# Patient Record
Sex: MALE | Race: WHITE | Employment: OTHER
[De-identification: names, ages, dates, MRNs, and addresses within clinical notes are randomized per-mention and may not be internally consistent; named-entity substitution may affect disease eponyms.]

---

## 2017-01-04 ENCOUNTER — PRIOR ORIGINAL RECORDS (OUTPATIENT)
Dept: OTHER | Age: 77
End: 2017-01-04

## 2017-05-03 ENCOUNTER — PRIOR ORIGINAL RECORDS (OUTPATIENT)
Dept: OTHER | Age: 77
End: 2017-05-03

## 2017-06-17 ENCOUNTER — HOSPITAL ENCOUNTER (OUTPATIENT)
Facility: HOSPITAL | Age: 77
Setting detail: OBSERVATION
Discharge: HOME | End: 2017-06-19
Attending: EMERGENCY MEDICINE | Admitting: HOSPITALIST
Payer: MEDICARE

## 2017-06-17 DIAGNOSIS — R31.9 HEMATURIA: Primary | ICD-10-CM

## 2017-06-17 LAB
ALBUMIN SERPL-MCNC: 3.4 G/DL (ref 3.5–4.8)
ALP LIVER SERPL-CCNC: 64 U/L (ref 45–117)
ALT SERPL-CCNC: 20 U/L (ref 17–63)
AST SERPL-CCNC: 39 U/L (ref 15–41)
BASOPHILS # BLD AUTO: 0.02 X10(3) UL (ref 0–0.1)
BASOPHILS NFR BLD AUTO: 0.4 %
BILIRUB SERPL-MCNC: 0.8 MG/DL (ref 0.1–2)
BUN BLD-MCNC: 21 MG/DL (ref 8–20)
CALCIUM BLD-MCNC: 9.7 MG/DL (ref 8.3–10.3)
CHLORIDE: 108 MMOL/L (ref 101–111)
CO2: 26 MMOL/L (ref 22–32)
CREAT BLD-MCNC: 0.98 MG/DL (ref 0.7–1.3)
EOSINOPHIL # BLD AUTO: 0.07 X10(3) UL (ref 0–0.3)
EOSINOPHIL NFR BLD AUTO: 1.4 %
ERYTHROCYTE [DISTWIDTH] IN BLOOD BY AUTOMATED COUNT: 13.8 % (ref 11.5–16)
GLUCOSE BLD-MCNC: 91 MG/DL (ref 70–99)
HCT VFR BLD AUTO: 39.7 % (ref 37–53)
HGB BLD-MCNC: 13.4 G/DL (ref 13–17)
IMMATURE GRANULOCYTE COUNT: 0.01 X10(3) UL (ref 0–1)
IMMATURE GRANULOCYTE RATIO %: 0.2 %
INR BLD: 2.95 (ref 0.89–1.11)
LYMPHOCYTES # BLD AUTO: 0.49 X10(3) UL (ref 0.9–4)
LYMPHOCYTES NFR BLD AUTO: 9.6 %
M PROTEIN MFR SERPL ELPH: 6.8 G/DL (ref 6.1–8.3)
MCH RBC QN AUTO: 31.2 PG (ref 27–33.2)
MCHC RBC AUTO-ENTMCNC: 33.8 G/DL (ref 31–37)
MCV RBC AUTO: 92.5 FL (ref 80–99)
MONOCYTES # BLD AUTO: 0.45 X10(3) UL (ref 0.1–0.6)
MONOCYTES NFR BLD AUTO: 8.8 %
NEUTROPHIL ABS PRELIM: 4.05 X10 (3) UL (ref 1.3–6.7)
NEUTROPHILS # BLD AUTO: 4.05 X10(3) UL (ref 1.3–6.7)
NEUTROPHILS NFR BLD AUTO: 79.6 %
PLATELET # BLD AUTO: 119 10(3)UL (ref 150–450)
POTASSIUM SERPL-SCNC: 4.4 MMOL/L (ref 3.6–5.1)
PSA SERPL DL<=0.01 NG/ML-MCNC: 31.4 SECONDS (ref 12–14.3)
RBC # BLD AUTO: 4.29 X10(6)UL (ref 3.8–5.8)
RED CELL DISTRIBUTION WIDTH-SD: 47.3 FL (ref 35.1–46.3)
SODIUM SERPL-SCNC: 138 MMOL/L (ref 136–144)
WBC # BLD AUTO: 5.1 X10(3) UL (ref 4–13)

## 2017-06-17 PROCEDURE — 85610 PROTHROMBIN TIME: CPT | Performed by: EMERGENCY MEDICINE

## 2017-06-17 PROCEDURE — 51702 INSERT TEMP BLADDER CATH: CPT

## 2017-06-17 PROCEDURE — 93005 ELECTROCARDIOGRAM TRACING: CPT

## 2017-06-17 PROCEDURE — 36415 COLL VENOUS BLD VENIPUNCTURE: CPT

## 2017-06-17 PROCEDURE — 99285 EMERGENCY DEPT VISIT HI MDM: CPT

## 2017-06-17 PROCEDURE — 51700 IRRIGATION OF BLADDER: CPT

## 2017-06-17 PROCEDURE — 85025 COMPLETE CBC W/AUTO DIFF WBC: CPT | Performed by: EMERGENCY MEDICINE

## 2017-06-17 PROCEDURE — 93010 ELECTROCARDIOGRAM REPORT: CPT | Performed by: INTERNAL MEDICINE

## 2017-06-17 PROCEDURE — 80053 COMPREHEN METABOLIC PANEL: CPT | Performed by: EMERGENCY MEDICINE

## 2017-06-17 PROCEDURE — 3E1K78Z IRRIGATION OF GENITOURINARY TRACT USING IRRIGATING SUBSTANCE, VIA NATURAL OR ARTIFICIAL OPENING: ICD-10-PCS | Performed by: EMERGENCY MEDICINE

## 2017-06-17 RX ORDER — ACETAMINOPHEN 325 MG/1
325 TABLET ORAL EVERY 6 HOURS PRN
Status: DISCONTINUED | OUTPATIENT
Start: 2017-06-17 | End: 2017-06-19

## 2017-06-17 RX ORDER — MULTIPLE VITAMINS W/ MINERALS TAB 9MG-400MCG
1 TAB ORAL DAILY
Status: DISCONTINUED | OUTPATIENT
Start: 2017-06-17 | End: 2017-06-19

## 2017-06-17 RX ORDER — LIDOCAINE HYDROCHLORIDE 20 MG/ML
10 JELLY TOPICAL ONCE
Status: COMPLETED | OUTPATIENT
Start: 2017-06-17 | End: 2017-06-17

## 2017-06-17 RX ORDER — DOCUSATE SODIUM 100 MG/1
100 CAPSULE, LIQUID FILLED ORAL DAILY PRN
Status: DISCONTINUED | OUTPATIENT
Start: 2017-06-17 | End: 2017-06-19

## 2017-06-17 NOTE — ED INITIAL ASSESSMENT (HPI)
77YM c/c of urinary symptoms Pt state he been having bloody urine since 2200 last night Pt state he passing clots and is on a blood thinner

## 2017-06-17 NOTE — PROGRESS NOTES
1230 PM: As per Dr. Afshan Patino patient to be NPO for a possible procedure. Patient and wife made aware    1812PM: Dr. Holly Bucio made aware of EKG result. NNO .  Output became pinkish in color

## 2017-06-17 NOTE — CONSULTS
[facility]    PATIENT'S NAME: Breezy Esteves   ATTENDING PHYSICIAN: Spring Ireland M.D.   CONSULTING PHYSICIAN: Araceli Valdez M.D.    PATIENT ACCOUNT#:   [de-identified]    LOCATION:  [address] [place] [address]  MEDICAL RECORD #:   [id number]       DATE OF BIRTH: because of mechanical aortic valve replacement, was restarted on Coumadin. Apparently last night, patient developed dark red blood coming from his Lal catheter with abdominal pain and passage of clots.   Since the patient has been in the hospital, he rec normal.  CBC is normal.    EKG pending at this time. ASSESSMENT:  Genitourinary bleeding, probably related to small trauma or small bleeder post prostate surgery in the setting of full-dose anticoagulation. We will hold Coumadin but will not reverse.

## 2017-06-17 NOTE — ED PROVIDER NOTES
Patient Seen in: [facility] Emergency Department    History   Patient presents with:  Urinary Symptoms (urologic)    Stated Complaint:     HPI    64-year-old male complaining of hematuria.   The patient has a history of mechanical aortic valve replac Comment left superior gluteal artery aneurysm       Medications :   docusate sodium 100 MG Oral Cap,  Take 1 capsule (100 mg total) by mouth daily as needed for constipation.    Warfarin Sodium 4 MG Oral Tab,  Take 1 or 1-1/2 tablets by mouth daily as direc suprapubic abdominal tenderness no masses guarding or rebound genitourinary exam is grossly normal neurologic exam no focal deficits.     ED Course     Labs Reviewed   COMP METABOLIC PANEL (14) - Abnormal; Notable for the following:     BUN 21 (*)     Album

## 2017-06-17 NOTE — H&P
[facility] hospitalist H+P    PCP;Jake Caceres MD  CC bloody urine    HPI 77 yo male with hx of A-flutter, mechanical AVR, pacemaker,  here with hematuria.  Patient developed suprapubic discomfort and difficulty urinating, was Passing clots throuhout night status: Former Smoker  1.00 Packs/Day  For 20.00 Years     Quit date: 04/29/1978    Smokeless tobacco: Never Used    Alcohol Use: Yes    Comment: wine 1-2 drinks week    Drug Use: No    Sexual Activity: Not on file   Not on file  Other Topics Concern    Ex consulted  If patient needs  procedure/cystoscopy then would need Amoxicillin 1 hour prior to procedure to reduce risk of prosthetic valve endocarditis    Mechanical Aortic valve replacement; needs to be on anticoagulation  Given his hematuria consulted

## 2017-06-17 NOTE — PROGRESS NOTES
NURSING ADMISSION NOTE      Patient admitted via stretcher  Oriented to room. Safety precautions initiated. Bed in low position. Call light in reach.     Dr. Conrado Roca was paged regarding new consult

## 2017-06-17 NOTE — CONSULTS
[facility] Cardiology Consultation Torsten Delacruz MD    The patient was interviewed, examined, the chart was reviewed and the consult was dictated. This is a 76year old male with a chief complaint of hematuria.     Impression:  1.   bleed–post prostate surger

## 2017-06-18 ENCOUNTER — ANESTHESIA (OUTPATIENT)
Dept: SURGERY | Facility: HOSPITAL | Age: 77
End: 2017-06-18

## 2017-06-18 ENCOUNTER — SURGERY (OUTPATIENT)
Age: 77
End: 2017-06-18

## 2017-06-18 ENCOUNTER — ANESTHESIA EVENT (OUTPATIENT)
Dept: SURGERY | Facility: HOSPITAL | Age: 77
End: 2017-06-18

## 2017-06-18 DIAGNOSIS — R31.0 GROSS HEMATURIA: Primary | ICD-10-CM

## 2017-06-18 LAB
ATRIAL RATE: 63 BPM
BASOPHILS # BLD AUTO: 0.03 X10(3) UL (ref 0–0.1)
BASOPHILS NFR BLD AUTO: 0.7 %
EOSINOPHIL # BLD AUTO: 0.11 X10(3) UL (ref 0–0.3)
EOSINOPHIL NFR BLD AUTO: 2.4 %
ERYTHROCYTE [DISTWIDTH] IN BLOOD BY AUTOMATED COUNT: 14.2 % (ref 11.5–16)
HCT VFR BLD AUTO: 39.9 % (ref 37–53)
HGB BLD-MCNC: 13.1 G/DL (ref 13–17)
IMMATURE GRANULOCYTE COUNT: 0.01 X10(3) UL (ref 0–1)
IMMATURE GRANULOCYTE RATIO %: 0.2 %
INR BLD: 2.4 (ref 0.89–1.11)
LYMPHOCYTES # BLD AUTO: 0.71 X10(3) UL (ref 0.9–4)
LYMPHOCYTES NFR BLD AUTO: 15.4 %
MCH RBC QN AUTO: 30.6 PG (ref 27–33.2)
MCHC RBC AUTO-ENTMCNC: 32.8 G/DL (ref 31–37)
MCV RBC AUTO: 93.2 FL (ref 80–99)
MONOCYTES # BLD AUTO: 0.5 X10(3) UL (ref 0.1–0.6)
MONOCYTES NFR BLD AUTO: 10.9 %
NEUTROPHIL ABS PRELIM: 3.24 X10 (3) UL (ref 1.3–6.7)
NEUTROPHILS # BLD AUTO: 3.24 X10(3) UL (ref 1.3–6.7)
NEUTROPHILS NFR BLD AUTO: 70.4 %
P AXIS: 42 DEGREES
P-R INTERVAL: 210 MS
PLATELET # BLD AUTO: 113 10(3)UL (ref 150–450)
PSA SERPL DL<=0.01 NG/ML-MCNC: 26.6 SECONDS (ref 12–14.3)
Q-T INTERVAL: 410 MS
QRS DURATION: 94 MS
QTC CALCULATION (BEZET): 419 MS
R AXIS: 0 DEGREES
RBC # BLD AUTO: 4.28 X10(6)UL (ref 3.8–5.8)
RED CELL DISTRIBUTION WIDTH-SD: 48.7 FL (ref 35.1–46.3)
T AXIS: 27 DEGREES
VENTRICULAR RATE: 63 BPM
WBC # BLD AUTO: 4.6 X10(3) UL (ref 4–13)

## 2017-06-18 PROCEDURE — 85610 PROTHROMBIN TIME: CPT | Performed by: HOSPITALIST

## 2017-06-18 PROCEDURE — 0T5B8ZZ DESTRUCTION OF BLADDER, VIA NATURAL OR ARTIFICIAL OPENING ENDOSCOPIC: ICD-10-PCS | Performed by: UROLOGY

## 2017-06-18 PROCEDURE — 85025 COMPLETE CBC W/AUTO DIFF WBC: CPT | Performed by: HOSPITALIST

## 2017-06-18 PROCEDURE — 0TCB8ZZ EXTIRPATION OF MATTER FROM BLADDER, VIA NATURAL OR ARTIFICIAL OPENING ENDOSCOPIC: ICD-10-PCS | Performed by: UROLOGY

## 2017-06-18 RX ORDER — MAGNESIUM HYDROXIDE 1200 MG/15ML
3000 LIQUID ORAL CONTINUOUS
Status: DISCONTINUED | OUTPATIENT
Start: 2017-06-18 | End: 2017-06-19

## 2017-06-18 RX ORDER — NALOXONE HYDROCHLORIDE 0.4 MG/ML
80 INJECTION, SOLUTION INTRAMUSCULAR; INTRAVENOUS; SUBCUTANEOUS AS NEEDED
Status: DISCONTINUED | OUTPATIENT
Start: 2017-06-18 | End: 2017-06-18 | Stop reason: HOSPADM

## 2017-06-18 RX ORDER — HYDROMORPHONE HYDROCHLORIDE 1 MG/ML
0.5 INJECTION, SOLUTION INTRAMUSCULAR; INTRAVENOUS; SUBCUTANEOUS EVERY 2 HOUR PRN
Status: DISCONTINUED | OUTPATIENT
Start: 2017-06-18 | End: 2017-06-19

## 2017-06-18 RX ORDER — OXYBUTYNIN CHLORIDE 5 MG/1
5 TABLET ORAL 3 TIMES DAILY
Status: DISCONTINUED | OUTPATIENT
Start: 2017-06-18 | End: 2017-06-19

## 2017-06-18 RX ORDER — ACETAMINOPHEN 325 MG/1
650 TABLET ORAL EVERY 6 HOURS
Status: DISCONTINUED | OUTPATIENT
Start: 2017-06-18 | End: 2017-06-19

## 2017-06-18 RX ORDER — SODIUM CHLORIDE, SODIUM LACTATE, POTASSIUM CHLORIDE, CALCIUM CHLORIDE 600; 310; 30; 20 MG/100ML; MG/100ML; MG/100ML; MG/100ML
INJECTION, SOLUTION INTRAVENOUS CONTINUOUS
Status: DISCONTINUED | OUTPATIENT
Start: 2017-06-18 | End: 2017-06-19

## 2017-06-18 RX ORDER — ZOLPIDEM TARTRATE 5 MG/1
5 TABLET ORAL NIGHTLY PRN
Status: DISCONTINUED | OUTPATIENT
Start: 2017-06-18 | End: 2017-06-19

## 2017-06-18 RX ORDER — HYDROMORPHONE HYDROCHLORIDE 1 MG/ML
1 INJECTION, SOLUTION INTRAMUSCULAR; INTRAVENOUS; SUBCUTANEOUS EVERY 2 HOUR PRN
Status: DISCONTINUED | OUTPATIENT
Start: 2017-06-18 | End: 2017-06-19

## 2017-06-18 RX ORDER — ONDANSETRON 4 MG/1
4 TABLET, FILM COATED ORAL EVERY 6 HOURS PRN
Status: DISCONTINUED | OUTPATIENT
Start: 2017-06-18 | End: 2017-06-19

## 2017-06-18 RX ORDER — ONDANSETRON 2 MG/ML
4 INJECTION INTRAMUSCULAR; INTRAVENOUS AS NEEDED
Status: DISCONTINUED | OUTPATIENT
Start: 2017-06-18 | End: 2017-06-18 | Stop reason: HOSPADM

## 2017-06-18 RX ORDER — HYDROMORPHONE HYDROCHLORIDE 1 MG/ML
0.4 INJECTION, SOLUTION INTRAMUSCULAR; INTRAVENOUS; SUBCUTANEOUS EVERY 5 MIN PRN
Status: DISCONTINUED | OUTPATIENT
Start: 2017-06-18 | End: 2017-06-18 | Stop reason: HOSPADM

## 2017-06-18 RX ORDER — ONDANSETRON 2 MG/ML
4 INJECTION INTRAMUSCULAR; INTRAVENOUS EVERY 6 HOURS PRN
Status: DISCONTINUED | OUTPATIENT
Start: 2017-06-18 | End: 2017-06-19

## 2017-06-18 RX ORDER — PHENAZOPYRIDINE HYDROCHLORIDE 200 MG/1
200 TABLET, FILM COATED ORAL 3 TIMES DAILY PRN
Status: DISCONTINUED | OUTPATIENT
Start: 2017-06-18 | End: 2017-06-19

## 2017-06-18 RX ORDER — DOCUSATE SODIUM 100 MG/1
100 CAPSULE, LIQUID FILLED ORAL 2 TIMES DAILY
Status: DISCONTINUED | OUTPATIENT
Start: 2017-06-18 | End: 2017-06-19

## 2017-06-18 RX ORDER — AMOXICILLIN 500 MG/1
2000 CAPSULE ORAL ONCE AS NEEDED
Status: COMPLETED | OUTPATIENT
Start: 2017-06-18 | End: 2017-06-18

## 2017-06-18 NOTE — ANESTHESIA PREPROCEDURE EVALUATION
PRE-OP EVALUATION    Patient Name: Kevin Waller    Pre-op Diagnosis: Gross hematuria [R31.0]    Procedure(s):  Cystoscopy, Clot evacuation fulguration of bleeding    Surgeon(s) and Role:     Neema Sigala MD - Primary    Pre-op vitals reviewed.   Tem ROS.                              Pulmonary    Negative pulmonary ROS. Neuro/Psych    Negative neuro/psych ROS.                                     Past Surgical History    OTHER      Comment TURP    OTHER  2005    EYE SURGERY  2002 discussed with surgeon and patient. Comment: Discussed risks and benefits of GA including sore throat, allergy, nausea, vomiting, dental trauma, pain management modalities, transfusion if needed, etc. Questions answered and options explained.  Patient ac

## 2017-06-18 NOTE — BRIEF OP NOTE
Pre-Operative Diagnosis: Gross hematuria [R31.0], clot retention     Post-Operative Diagnosis: Gross hematuria [R31.0], clot retention     Procedure Performed:   Procedure(s):  Cystoscopy, Clot evacuation fulguration of bleeding    Surgeon(s) and Role:

## 2017-06-18 NOTE — PROGRESS NOTES
Patient went down to OR for procedure. Dentures at the bedside, eyeglass with patient. Wife made aware. Also RN told Surgery about Preop med Amoxicillin PO as per cardio and Metoprolol. Given with sips of water    0830AM: Wife came in and at the bedside.  J

## 2017-06-18 NOTE — ANESTHESIA POSTPROCEDURE EVALUATION
[address] [place] [address] Patient Status:  Observation   Age/Gender 76year old male MRN [id number]   Location [address] Attending Kevan Hunter MD   [address] Day # 1 PCP Joseline Gil MD       Anesthesia Post-op Note

## 2017-06-18 NOTE — PROGRESS NOTES
[facility] hospitalist daily note    Patient was seen/examined on 6/18/17    S; s/p cystoscopy, clot evaluation fulguration of bleeding.  Patient seen post-procedure, hematuria better during my visit, no abdominal pain, no nausea/emesis, no chest pain or SOB

## 2017-06-18 NOTE — PROGRESS NOTES
[facility] Cardiology Progress Note        Lesly Elias Patient Status:  Observation    3/25/1940 MRN [id number]   [facility] 3SW-A Attending Rajeev Lemus MD   Hosp Day # 1 PCP Veronica Moreno MD     Subjective:  Doing well TROP, CK in the last 72 hours.     Recent Labs   Lab  06/17/17   0624  06/18/17   0630   PTP  31.4*  26.6*   INR  2.95*  2.40*                 Impression:  1.      Genitourinary bleed post prostate surgery on Coumadin with therapeutic INR.    s/p cysto toda

## 2017-06-19 VITALS
DIASTOLIC BLOOD PRESSURE: 88 MMHG | RESPIRATION RATE: 18 BRPM | OXYGEN SATURATION: 97 % | HEART RATE: 60 BPM | WEIGHT: 175 LBS | SYSTOLIC BLOOD PRESSURE: 128 MMHG | TEMPERATURE: 99 F | BODY MASS INDEX: 23 KG/M2

## 2017-06-19 LAB
INR BLD: 1.9 (ref 0.89–1.11)
PSA SERPL DL<=0.01 NG/ML-MCNC: 22.1 SECONDS (ref 12–14.3)

## 2017-06-19 PROCEDURE — 85610 PROTHROMBIN TIME: CPT | Performed by: INTERNAL MEDICINE

## 2017-06-19 PROCEDURE — 36415 COLL VENOUS BLD VENIPUNCTURE: CPT | Performed by: INTERNAL MEDICINE

## 2017-06-19 RX ORDER — FINASTERIDE 5 MG/1
5 TABLET, FILM COATED ORAL DAILY
Qty: 90 TABLET | Refills: 3 | Status: SHIPPED | OUTPATIENT
Start: 2017-06-19 | End: 2017-10-26

## 2017-06-19 RX ORDER — FINASTERIDE 5 MG/1
5 TABLET, FILM COATED ORAL DAILY
Status: DISCONTINUED | OUTPATIENT
Start: 2017-06-19 | End: 2017-06-19

## 2017-06-19 RX ORDER — FINASTERIDE 5 MG/1
5 TABLET, FILM COATED ORAL DAILY
Qty: 90 TABLET | Refills: 3 | Status: SHIPPED | OUTPATIENT
Start: 2017-06-19 | End: 2017-06-19

## 2017-06-19 NOTE — PROGRESS NOTES
[facility]  Urology Progress Note    Jorge Frias Patient Status:  Observation    3/25/1940 MRN [id number]   [facility] 3SW-A Attending Aleksander Shields MD   Hosp Day # 2 PCP Raymundo Krueger MD     Subjective:  Jorge Frias is a(n)

## 2017-06-19 NOTE — PLAN OF CARE
GENITOURINARY - ADULT    • Absence of urinary retention Progressing        PAIN - ADULT    • Verbalizes/displays adequate comfort level or patient's stated pain goal Progressing        SAFETY ADULT - FALL    • Free from fall injury Progressing        Carol Beckett

## 2017-06-19 NOTE — PROGRESS NOTES
[facility] Cardiology Progress Note - Molina Rayo Patient Status:  Observation    3/25/1940 MRN [id number]   [facility] 3SW-A Attending René Leaver,*   Hosp Day # 2 PCP Camilo Chery MD     Subjective 1675 ml   Net    960 ml       Last 3 Weights  06/17/17 0543 : 175 lb (79.379 kg)  02/13/17 1130 : 176 lb 6.4 oz (80.015 kg)  10/13/16 1013 : 174 lb (78.926 kg)      Tele: NSR    Physical Exam:    General: Alert and oriented x 3. No apparent distress.  No re MG/ML injection 1 mg 1 mg Intravenous Q2H PRN   Zolpidem Tartrate (AMBIEN) tab 5 mg 5 mg Oral Nightly PRN   docusate sodium (COLACE) cap 100 mg 100 mg Oral BID   ondansetron HCl (ZOFRAN) injection 4 mg 4 mg Intravenous Q6H PRN   Or      Ondansetron HCl (ZO

## 2017-06-19 NOTE — PROGRESS NOTES
[facility] Hospitalist Progress Note                                                                   [address] [facility]  3/25/1940    CC: FU hematuria    Interval History:  - Doing well- hancock remov documentation yesterday, except as otherwise noted in the Interval History above.     Assessment/Plan:  Hematuria; CBI started during admission, Urology consulted  -s/p Cystoscopy, Clot evacuation fulguration of bleeding on 6/18/17  -found to have large BPH

## 2017-06-21 NOTE — OPERATIVE REPORT
[facility]    PATIENT'S NAME: [facility] PHYSICIAN: Mary Conde MD   OPERATING PHYSICIAN: Rajendra Tadeo M.D.    PATIENT ACCOUNT#:   [de-identified]    LOCATION:  3SW-A [facility]  MEDICAL RECORD #:   [id number]       DATE OF FRANCINE mass, or lesion. There was evidence of a small stone present in this region of the prostate too, which was then manipulated free.   With good hemostasis seen, the cystoscope was then withdrawn and a 22-Swedish 3-way Lal catheter was placed and the patient

## 2017-06-22 NOTE — DISCHARGE SUMMARY
General Medicine Discharge Summary     Patient ID:  Anthony Chaudhry  76year old  3/25/1940    Admit date: 6/17/2017    Discharge date and time: 6/19/17    Attending Physician: No att. providers found may be related to the mechanical valve, plt count without significant change from 6/17/17    Aflutter, SSS, PPM; cont metoprolol    Preventative: coumadin      Consults: IP CONSULT TO UROLOGY    Operative Procedures: Procedure(s) (LRB):  CYSTOSCOPY (N/A) MD Syeda Penaloza MD  [facility] Hospitalist  Pager: [phone]

## 2017-11-01 ENCOUNTER — PRIOR ORIGINAL RECORDS (OUTPATIENT)
Dept: OTHER | Age: 77
End: 2017-11-01

## 2017-12-31 ENCOUNTER — PRIOR ORIGINAL RECORDS (OUTPATIENT)
Dept: OTHER | Age: 77
End: 2017-12-31

## 2018-05-02 ENCOUNTER — PRIOR ORIGINAL RECORDS (OUTPATIENT)
Dept: OTHER | Age: 78
End: 2018-05-02

## 2018-11-07 ENCOUNTER — PRIOR ORIGINAL RECORDS (OUTPATIENT)
Dept: OTHER | Age: 78
End: 2018-11-07

## 2019-02-16 ENCOUNTER — APPOINTMENT (OUTPATIENT)
Dept: LAB | Facility: HOSPITAL | Age: 79
End: 2019-02-16
Attending: INTERNAL MEDICINE
Payer: MEDICARE

## 2019-02-16 DIAGNOSIS — I49.5 SICK SINUS SYNDROME (HCC): ICD-10-CM

## 2019-02-16 DIAGNOSIS — I48.92 ATRIAL FLUTTER, PAROXYSMAL (HCC): ICD-10-CM

## 2019-02-16 DIAGNOSIS — Z79.01 LONG TERM CURRENT USE OF ANTICOAGULANT: ICD-10-CM

## 2019-02-16 DIAGNOSIS — I48.3 TYPICAL ATRIAL FLUTTER (HCC): ICD-10-CM

## 2019-02-16 DIAGNOSIS — I34.0 NON-RHEUMATIC MITRAL REGURGITATION: ICD-10-CM

## 2019-02-16 DIAGNOSIS — Z95.2 S/P AORTIC VALVE REPLACEMENT: ICD-10-CM

## 2019-02-16 LAB
ALBUMIN SERPL-MCNC: 3.8 G/DL (ref 3.4–5)
ALP LIVER SERPL-CCNC: 66 U/L (ref 45–117)
ALT SERPL-CCNC: 27 U/L (ref 16–61)
AST SERPL-CCNC: 52 U/L (ref 15–37)
BILIRUB DIRECT SERPL-MCNC: 0.2 MG/DL (ref 0–0.2)
BILIRUB SERPL-MCNC: 1.1 MG/DL (ref 0.1–2)
CHOLEST SMN-MCNC: 135 MG/DL (ref ?–200)
HDLC SERPL-MCNC: 51 MG/DL (ref 40–59)
INR BLD: 2.44 (ref 0.9–1.1)
LDLC SERPL CALC-MCNC: 68 MG/DL (ref ?–100)
M PROTEIN MFR SERPL ELPH: 7.3 G/DL (ref 6.4–8.2)
NONHDLC SERPL-MCNC: 84 MG/DL (ref ?–130)
PSA SERPL DL<=0.01 NG/ML-MCNC: 27.3 SECONDS (ref 12.4–14.7)
TRIGL SERPL-MCNC: 79 MG/DL (ref 30–149)
VLDLC SERPL CALC-MCNC: 16 MG/DL (ref 0–30)

## 2019-02-16 PROCEDURE — 85610 PROTHROMBIN TIME: CPT

## 2019-02-16 PROCEDURE — 36415 COLL VENOUS BLD VENIPUNCTURE: CPT

## 2019-02-16 PROCEDURE — 80076 HEPATIC FUNCTION PANEL: CPT

## 2019-02-16 PROCEDURE — 80061 LIPID PANEL: CPT

## 2019-03-11 ENCOUNTER — ORDER TRANSCRIPTION (OUTPATIENT)
Dept: ADMINISTRATIVE | Facility: HOSPITAL | Age: 79
End: 2019-03-11

## 2019-03-11 DIAGNOSIS — I77.89 ASCENDING AORTA ENLARGEMENT (HCC): Primary | ICD-10-CM

## 2019-03-11 DIAGNOSIS — I77.810 AORTIC ROOT DILATION (HCC): ICD-10-CM

## 2019-03-22 ENCOUNTER — HOSPITAL ENCOUNTER (OUTPATIENT)
Dept: CT IMAGING | Facility: HOSPITAL | Age: 79
Discharge: HOME | End: 2019-03-22
Attending: INTERNAL MEDICINE
Payer: MEDICARE

## 2019-03-22 DIAGNOSIS — I77.89 ASCENDING AORTA ENLARGEMENT (HCC): ICD-10-CM

## 2019-03-22 DIAGNOSIS — I77.810 AORTIC ROOT DILATION (HCC): ICD-10-CM

## 2019-03-22 DIAGNOSIS — I71.2 THORACIC AORTIC ANEURYSM WITHOUT RUPTURE (HCC): ICD-10-CM

## 2019-03-22 LAB — CREAT SERPL-MCNC: 1 MG/DL (ref 0.7–1.3)

## 2019-03-22 PROCEDURE — 71275 CT ANGIOGRAPHY CHEST: CPT | Performed by: INTERNAL MEDICINE

## 2019-03-22 PROCEDURE — 82565 ASSAY OF CREATININE: CPT

## 2019-04-05 ENCOUNTER — HOSPITAL ENCOUNTER (OUTPATIENT)
Age: 79
Discharge: HOME | End: 2019-04-05
Attending: FAMILY MEDICINE
Payer: MEDICARE

## 2019-04-05 VITALS
BODY MASS INDEX: 23.19 KG/M2 | SYSTOLIC BLOOD PRESSURE: 144 MMHG | HEART RATE: 55 BPM | WEIGHT: 175 LBS | DIASTOLIC BLOOD PRESSURE: 99 MMHG | OXYGEN SATURATION: 98 % | HEIGHT: 73 IN | RESPIRATION RATE: 18 BRPM | TEMPERATURE: 98 F

## 2019-04-05 DIAGNOSIS — N41.0 ACUTE PROSTATITIS: Primary | ICD-10-CM

## 2019-04-05 PROCEDURE — 87088 URINE BACTERIA CULTURE: CPT | Performed by: FAMILY MEDICINE

## 2019-04-05 PROCEDURE — 87186 SC STD MICRODIL/AGAR DIL: CPT | Performed by: FAMILY MEDICINE

## 2019-04-05 PROCEDURE — 99214 OFFICE O/P EST MOD 30 MIN: CPT

## 2019-04-05 PROCEDURE — 87086 URINE CULTURE/COLONY COUNT: CPT | Performed by: FAMILY MEDICINE

## 2019-04-05 PROCEDURE — 81002 URINALYSIS NONAUTO W/O SCOPE: CPT | Performed by: FAMILY MEDICINE

## 2019-04-05 PROCEDURE — 99204 OFFICE O/P NEW MOD 45 MIN: CPT

## 2019-04-05 RX ORDER — AMOXICILLIN 875 MG/1
875 TABLET, COATED ORAL 2 TIMES DAILY
Qty: 28 TABLET | Refills: 0 | Status: SHIPPED | OUTPATIENT
Start: 2019-04-05 | End: 2019-04-19

## 2019-04-05 NOTE — ED INITIAL ASSESSMENT (HPI)
Pt c/o \" I think I have prostatitis\". Pt reports mild pain, heaviness in peritoneum that started 1 week ago and has now resolved. Pt states he has noticed a foul smelling urine over this past week. Denies fever, chills, hematuria, or other complaints.

## 2019-04-05 NOTE — ED PROVIDER NOTES
Patient Seen in: [address] [facility]    History   Patient presents with:  Urinary Symptoms (urologic)    Stated Complaint: urinary complaint x2 weeks    HPI    33-year-old male with history of prostatitis and extensive cardiac histo replacement   • OTHER SURGICAL HISTORY  2002    Mitral valve repair   • OTHER SURGICAL HISTORY      inguinal hernia repair   • OTHER SURGICAL HISTORY      spermatocele   • PACEMAKER  2002    [facility]   • REPLACEMENT OF MITRAL VALVE         Family histo Moderate (*)     Nitrite Urine Positive (*)     Leukocyte esterase urine Small (*)     All other components within normal limits   URINE CULTURE, ROUTINE                MDM   78-year-old male with history of prostatitis had symptoms earlier in the week.   H

## 2019-05-08 ENCOUNTER — PRIOR ORIGINAL RECORDS (OUTPATIENT)
Dept: OTHER | Age: 79
End: 2019-05-08

## 2019-05-31 PROBLEM — Z98.890 S/P TRICUSPID VALVE REPAIR: Status: ACTIVE | Noted: 2019-05-31

## 2019-05-31 PROBLEM — Z95.2 S/P MVR (MITRAL VALVE REPLACEMENT): Status: ACTIVE | Noted: 2019-05-31

## 2019-06-24 ENCOUNTER — HOSPITAL ENCOUNTER (OUTPATIENT)
Dept: ULTRASOUND IMAGING | Facility: HOSPITAL | Age: 79
Discharge: HOME | End: 2019-06-24
Attending: INTERNAL MEDICINE
Payer: MEDICARE

## 2019-06-24 DIAGNOSIS — S30.1XXA HEMATOMA OF GROIN, INITIAL ENCOUNTER: ICD-10-CM

## 2019-06-24 PROCEDURE — 93926 LOWER EXTREMITY STUDY: CPT | Performed by: INTERNAL MEDICINE

## 2019-07-02 ENCOUNTER — HOSPITAL ENCOUNTER (OUTPATIENT)
Dept: ULTRASOUND IMAGING | Facility: HOSPITAL | Age: 79
Discharge: HOME | End: 2019-07-02
Attending: INTERNAL MEDICINE
Payer: MEDICARE

## 2019-07-02 DIAGNOSIS — S30.1XXD HEMATOMA OF GROIN, SUBSEQUENT ENCOUNTER: ICD-10-CM

## 2019-07-02 PROCEDURE — 93926 LOWER EXTREMITY STUDY: CPT | Performed by: INTERNAL MEDICINE

## 2019-07-08 ENCOUNTER — CARDPULM VISIT (OUTPATIENT)
Dept: CARDIAC REHAB | Facility: HOSPITAL | Age: 79
End: 2019-07-08
Attending: INTERNAL MEDICINE
Payer: MEDICARE

## 2019-07-08 VITALS
OXYGEN SATURATION: 94 % | DIASTOLIC BLOOD PRESSURE: 64 MMHG | RESPIRATION RATE: 22 BRPM | WEIGHT: 175 LBS | HEIGHT: 73 IN | BODY MASS INDEX: 23.19 KG/M2 | SYSTOLIC BLOOD PRESSURE: 110 MMHG | HEART RATE: 50 BPM

## 2019-07-08 RX ORDER — METOPROLOL SUCCINATE 25 MG/1
25 TABLET, EXTENDED RELEASE ORAL 2 TIMES DAILY
COMMUNITY
End: 2019-09-24 | Stop reason: DRUGHIGH

## 2019-07-08 RX ORDER — ASPIRIN 81 MG/1
81 TABLET, CHEWABLE ORAL DAILY
COMMUNITY

## 2019-07-10 ENCOUNTER — CARDPULM VISIT (OUTPATIENT)
Dept: CARDIAC REHAB | Facility: HOSPITAL | Age: 79
End: 2019-07-10
Attending: INTERNAL MEDICINE
Payer: MEDICARE

## 2019-07-10 PROCEDURE — 93798 PHYS/QHP OP CAR RHAB W/ECG: CPT

## 2019-07-12 ENCOUNTER — CARDPULM VISIT (OUTPATIENT)
Dept: CARDIAC REHAB | Facility: HOSPITAL | Age: 79
End: 2019-07-12
Attending: INTERNAL MEDICINE
Payer: MEDICARE

## 2019-07-12 PROCEDURE — 93798 PHYS/QHP OP CAR RHAB W/ECG: CPT

## 2019-07-15 ENCOUNTER — CARDPULM VISIT (OUTPATIENT)
Dept: CARDIAC REHAB | Facility: HOSPITAL | Age: 79
End: 2019-07-15
Attending: INTERNAL MEDICINE
Payer: MEDICARE

## 2019-07-15 PROCEDURE — 93798 PHYS/QHP OP CAR RHAB W/ECG: CPT

## 2019-07-17 ENCOUNTER — CARDPULM VISIT (OUTPATIENT)
Dept: CARDIAC REHAB | Facility: HOSPITAL | Age: 79
End: 2019-07-17
Attending: INTERNAL MEDICINE
Payer: MEDICARE

## 2019-07-17 PROCEDURE — 93798 PHYS/QHP OP CAR RHAB W/ECG: CPT

## 2019-07-19 ENCOUNTER — CARDPULM VISIT (OUTPATIENT)
Dept: CARDIAC REHAB | Facility: HOSPITAL | Age: 79
End: 2019-07-19
Attending: INTERNAL MEDICINE
Payer: MEDICARE

## 2019-07-19 PROCEDURE — 93798 PHYS/QHP OP CAR RHAB W/ECG: CPT

## 2019-07-22 ENCOUNTER — CARDPULM VISIT (OUTPATIENT)
Dept: CARDIAC REHAB | Facility: HOSPITAL | Age: 79
End: 2019-07-22
Attending: INTERNAL MEDICINE
Payer: MEDICARE

## 2019-07-22 PROCEDURE — 93798 PHYS/QHP OP CAR RHAB W/ECG: CPT

## 2019-07-24 ENCOUNTER — CARDPULM VISIT (OUTPATIENT)
Dept: CARDIAC REHAB | Facility: HOSPITAL | Age: 79
End: 2019-07-24
Attending: INTERNAL MEDICINE
Payer: MEDICARE

## 2019-07-24 PROCEDURE — 93798 PHYS/QHP OP CAR RHAB W/ECG: CPT

## 2019-07-26 ENCOUNTER — CARDPULM VISIT (OUTPATIENT)
Dept: CARDIAC REHAB | Facility: HOSPITAL | Age: 79
End: 2019-07-26
Attending: INTERNAL MEDICINE
Payer: MEDICARE

## 2019-07-26 PROCEDURE — 93798 PHYS/QHP OP CAR RHAB W/ECG: CPT

## 2019-07-29 ENCOUNTER — CARDPULM VISIT (OUTPATIENT)
Dept: CARDIAC REHAB | Facility: HOSPITAL | Age: 79
End: 2019-07-29
Attending: INTERNAL MEDICINE
Payer: MEDICARE

## 2019-07-29 PROCEDURE — 93798 PHYS/QHP OP CAR RHAB W/ECG: CPT

## 2019-07-31 ENCOUNTER — CARDPULM VISIT (OUTPATIENT)
Dept: CARDIAC REHAB | Facility: HOSPITAL | Age: 79
End: 2019-07-31
Attending: INTERNAL MEDICINE
Payer: MEDICARE

## 2019-07-31 PROCEDURE — 93798 PHYS/QHP OP CAR RHAB W/ECG: CPT

## 2019-08-02 ENCOUNTER — CARDPULM VISIT (OUTPATIENT)
Dept: CARDIAC REHAB | Facility: HOSPITAL | Age: 79
End: 2019-08-02
Attending: INTERNAL MEDICINE
Payer: MEDICARE

## 2019-08-02 PROCEDURE — 93798 PHYS/QHP OP CAR RHAB W/ECG: CPT

## 2019-08-05 ENCOUNTER — CARDPULM VISIT (OUTPATIENT)
Dept: CARDIAC REHAB | Facility: HOSPITAL | Age: 79
End: 2019-08-05
Attending: INTERNAL MEDICINE
Payer: MEDICARE

## 2019-08-05 PROCEDURE — 93798 PHYS/QHP OP CAR RHAB W/ECG: CPT

## 2019-08-07 ENCOUNTER — CARDPULM VISIT (OUTPATIENT)
Dept: CARDIAC REHAB | Facility: HOSPITAL | Age: 79
End: 2019-08-07
Attending: INTERNAL MEDICINE
Payer: MEDICARE

## 2019-08-07 PROCEDURE — 93798 PHYS/QHP OP CAR RHAB W/ECG: CPT

## 2019-08-09 ENCOUNTER — CARDPULM VISIT (OUTPATIENT)
Dept: CARDIAC REHAB | Facility: HOSPITAL | Age: 79
End: 2019-08-09
Attending: INTERNAL MEDICINE
Payer: MEDICARE

## 2019-08-09 PROCEDURE — 93798 PHYS/QHP OP CAR RHAB W/ECG: CPT

## 2019-08-12 ENCOUNTER — CARDPULM VISIT (OUTPATIENT)
Dept: CARDIAC REHAB | Facility: HOSPITAL | Age: 79
End: 2019-08-12
Attending: INTERNAL MEDICINE
Payer: MEDICARE

## 2019-08-12 PROCEDURE — 93798 PHYS/QHP OP CAR RHAB W/ECG: CPT

## 2019-08-14 ENCOUNTER — CARDPULM VISIT (OUTPATIENT)
Dept: CARDIAC REHAB | Facility: HOSPITAL | Age: 79
End: 2019-08-14
Attending: INTERNAL MEDICINE
Payer: MEDICARE

## 2019-08-14 PROCEDURE — 93798 PHYS/QHP OP CAR RHAB W/ECG: CPT

## 2019-08-16 ENCOUNTER — CARDPULM VISIT (OUTPATIENT)
Dept: CARDIAC REHAB | Facility: HOSPITAL | Age: 79
End: 2019-08-16
Attending: INTERNAL MEDICINE
Payer: MEDICARE

## 2019-08-16 PROCEDURE — 93798 PHYS/QHP OP CAR RHAB W/ECG: CPT

## 2019-08-19 ENCOUNTER — CARDPULM VISIT (OUTPATIENT)
Dept: CARDIAC REHAB | Facility: HOSPITAL | Age: 79
End: 2019-08-19
Attending: INTERNAL MEDICINE
Payer: MEDICARE

## 2019-08-19 PROCEDURE — 93798 PHYS/QHP OP CAR RHAB W/ECG: CPT

## 2019-08-21 ENCOUNTER — CARDPULM VISIT (OUTPATIENT)
Dept: CARDIAC REHAB | Facility: HOSPITAL | Age: 79
End: 2019-08-21
Attending: INTERNAL MEDICINE
Payer: MEDICARE

## 2019-08-21 PROCEDURE — 93798 PHYS/QHP OP CAR RHAB W/ECG: CPT

## 2019-08-23 ENCOUNTER — CARDPULM VISIT (OUTPATIENT)
Dept: CARDIAC REHAB | Facility: HOSPITAL | Age: 79
End: 2019-08-23
Attending: INTERNAL MEDICINE
Payer: MEDICARE

## 2019-08-23 PROCEDURE — 93798 PHYS/QHP OP CAR RHAB W/ECG: CPT

## 2019-08-26 ENCOUNTER — CARDPULM VISIT (OUTPATIENT)
Dept: CARDIAC REHAB | Facility: HOSPITAL | Age: 79
End: 2019-08-26
Attending: INTERNAL MEDICINE
Payer: MEDICARE

## 2019-08-26 PROCEDURE — 93798 PHYS/QHP OP CAR RHAB W/ECG: CPT

## 2019-08-28 ENCOUNTER — CARDPULM VISIT (OUTPATIENT)
Dept: CARDIAC REHAB | Facility: HOSPITAL | Age: 79
End: 2019-08-28
Attending: INTERNAL MEDICINE
Payer: MEDICARE

## 2019-08-28 PROCEDURE — 93798 PHYS/QHP OP CAR RHAB W/ECG: CPT

## 2019-08-30 ENCOUNTER — CARDPULM VISIT (OUTPATIENT)
Dept: CARDIAC REHAB | Facility: HOSPITAL | Age: 79
End: 2019-08-30
Attending: INTERNAL MEDICINE
Payer: MEDICARE

## 2019-08-30 PROCEDURE — 93798 PHYS/QHP OP CAR RHAB W/ECG: CPT

## 2019-09-04 ENCOUNTER — CARDPULM VISIT (OUTPATIENT)
Dept: CARDIAC REHAB | Facility: HOSPITAL | Age: 79
End: 2019-09-04
Attending: INTERNAL MEDICINE
Payer: MEDICARE

## 2019-09-04 PROCEDURE — 93798 PHYS/QHP OP CAR RHAB W/ECG: CPT

## 2019-09-06 ENCOUNTER — CARDPULM VISIT (OUTPATIENT)
Dept: CARDIAC REHAB | Facility: HOSPITAL | Age: 79
End: 2019-09-06
Attending: INTERNAL MEDICINE
Payer: MEDICARE

## 2019-09-06 PROCEDURE — 93798 PHYS/QHP OP CAR RHAB W/ECG: CPT

## 2019-09-09 ENCOUNTER — CARDPULM VISIT (OUTPATIENT)
Dept: CARDIAC REHAB | Facility: HOSPITAL | Age: 79
End: 2019-09-09
Attending: INTERNAL MEDICINE
Payer: MEDICARE

## 2019-09-09 PROCEDURE — 93798 PHYS/QHP OP CAR RHAB W/ECG: CPT

## 2019-09-11 ENCOUNTER — CARDPULM VISIT (OUTPATIENT)
Dept: CARDIAC REHAB | Facility: HOSPITAL | Age: 79
End: 2019-09-11
Attending: INTERNAL MEDICINE
Payer: MEDICARE

## 2019-09-11 PROCEDURE — 93798 PHYS/QHP OP CAR RHAB W/ECG: CPT

## 2019-09-13 ENCOUNTER — CARDPULM VISIT (OUTPATIENT)
Dept: CARDIAC REHAB | Facility: HOSPITAL | Age: 79
End: 2019-09-13
Attending: INTERNAL MEDICINE
Payer: MEDICARE

## 2019-09-13 PROCEDURE — 93798 PHYS/QHP OP CAR RHAB W/ECG: CPT

## 2019-09-16 ENCOUNTER — CARDPULM VISIT (OUTPATIENT)
Dept: CARDIAC REHAB | Facility: HOSPITAL | Age: 79
End: 2019-09-16
Attending: INTERNAL MEDICINE
Payer: MEDICARE

## 2019-09-16 PROCEDURE — 93798 PHYS/QHP OP CAR RHAB W/ECG: CPT

## 2019-09-18 ENCOUNTER — CARDPULM VISIT (OUTPATIENT)
Dept: CARDIAC REHAB | Facility: HOSPITAL | Age: 79
End: 2019-09-18
Attending: INTERNAL MEDICINE
Payer: MEDICARE

## 2019-09-18 PROCEDURE — 93798 PHYS/QHP OP CAR RHAB W/ECG: CPT

## 2019-09-20 ENCOUNTER — CARDPULM VISIT (OUTPATIENT)
Dept: CARDIAC REHAB | Facility: HOSPITAL | Age: 79
End: 2019-09-20
Attending: INTERNAL MEDICINE
Payer: MEDICARE

## 2019-09-20 PROCEDURE — 93798 PHYS/QHP OP CAR RHAB W/ECG: CPT

## 2019-09-23 ENCOUNTER — CARDPULM VISIT (OUTPATIENT)
Dept: CARDIAC REHAB | Facility: HOSPITAL | Age: 79
End: 2019-09-23
Attending: INTERNAL MEDICINE
Payer: MEDICARE

## 2019-09-23 PROCEDURE — 93798 PHYS/QHP OP CAR RHAB W/ECG: CPT

## 2019-09-25 ENCOUNTER — CARDPULM VISIT (OUTPATIENT)
Dept: CARDIAC REHAB | Facility: HOSPITAL | Age: 79
End: 2019-09-25
Attending: INTERNAL MEDICINE
Payer: MEDICARE

## 2019-09-25 PROCEDURE — 93798 PHYS/QHP OP CAR RHAB W/ECG: CPT

## 2019-09-27 ENCOUNTER — CARDPULM VISIT (OUTPATIENT)
Dept: CARDIAC REHAB | Facility: HOSPITAL | Age: 79
End: 2019-09-27
Attending: INTERNAL MEDICINE
Payer: MEDICARE

## 2019-09-27 PROCEDURE — 93798 PHYS/QHP OP CAR RHAB W/ECG: CPT

## 2019-09-30 ENCOUNTER — CARDPULM VISIT (OUTPATIENT)
Dept: CARDIAC REHAB | Facility: HOSPITAL | Age: 79
End: 2019-09-30
Attending: INTERNAL MEDICINE
Payer: MEDICARE

## 2019-09-30 PROCEDURE — 93798 PHYS/QHP OP CAR RHAB W/ECG: CPT

## 2019-10-08 ENCOUNTER — CARDPULM VISIT (OUTPATIENT)
Dept: CARDIAC REHAB | Facility: HOSPITAL | Age: 79
End: 2019-10-08
Attending: INTERNAL MEDICINE

## 2019-11-06 ENCOUNTER — PRIOR ORIGINAL RECORDS (OUTPATIENT)
Dept: OTHER | Age: 79
End: 2019-11-06

## 2019-11-18 ENCOUNTER — HOSPITAL ENCOUNTER (OUTPATIENT)
Dept: ULTRASOUND IMAGING | Facility: HOSPITAL | Age: 79
Discharge: HOME | End: 2019-11-18
Attending: INTERNAL MEDICINE
Payer: MEDICARE

## 2019-11-18 ENCOUNTER — HOSPITAL ENCOUNTER (OUTPATIENT)
Dept: CV DIAGNOSTICS | Facility: HOSPITAL | Age: 79
End: 2019-11-18
Attending: INTERNAL MEDICINE
Payer: MEDICARE

## 2019-11-18 DIAGNOSIS — R60.0 LEG EDEMA: ICD-10-CM

## 2019-11-18 PROCEDURE — 93970 EXTREMITY STUDY: CPT | Performed by: INTERNAL MEDICINE

## 2019-12-03 ENCOUNTER — CARDPULM VISIT (OUTPATIENT)
Dept: CARDIAC REHAB | Facility: HOSPITAL | Age: 79
End: 2019-12-03
Attending: INTERNAL MEDICINE

## 2019-12-31 ENCOUNTER — PRIOR ORIGINAL RECORDS (OUTPATIENT)
Dept: OTHER | Age: 79
End: 2019-12-31

## 2020-01-21 ENCOUNTER — CARDPULM VISIT (OUTPATIENT)
Dept: CARDIAC REHAB | Facility: HOSPITAL | Age: 80
End: 2020-01-21
Attending: INTERNAL MEDICINE

## 2020-02-03 ENCOUNTER — HOSPITAL ENCOUNTER (OUTPATIENT)
Dept: CV DIAGNOSTICS | Facility: HOSPITAL | Age: 80
Discharge: HOME | End: 2020-02-03
Attending: INTERNAL MEDICINE
Payer: MEDICARE

## 2020-02-03 DIAGNOSIS — Z95.2 S/P MVR (MITRAL VALVE REPLACEMENT): ICD-10-CM

## 2020-02-03 DIAGNOSIS — Z95.2 STATUS POST COMBINED AORTIC ROOT AND VALVE REPLACEMENT USING STENTLESS BIOPROSTHETIC AORTIC VALVE: ICD-10-CM

## 2020-02-03 DIAGNOSIS — Z95.4 STATUS POST COMBINED AORTIC ROOT AND VALVE REPLACEMENT USING STENTLESS BIOPROSTHETIC AORTIC VALVE: ICD-10-CM

## 2020-02-03 PROCEDURE — 93306 TTE W/DOPPLER COMPLETE: CPT | Performed by: INTERNAL MEDICINE

## 2020-02-26 ENCOUNTER — APPOINTMENT (OUTPATIENT)
Dept: LAB | Facility: HOSPITAL | Age: 80
End: 2020-02-26
Attending: INTERNAL MEDICINE
Payer: MEDICARE

## 2020-02-26 DIAGNOSIS — I50.30 DIASTOLIC HEART FAILURE, UNSPECIFIED HF CHRONICITY (HCC): ICD-10-CM

## 2020-02-26 DIAGNOSIS — Z79.899 ENCOUNTER FOR LONG-TERM (CURRENT) USE OF MEDICATIONS: ICD-10-CM

## 2020-02-26 LAB
ANION GAP SERPL CALC-SCNC: 0 MMOL/L (ref 0–18)
BUN BLD-MCNC: 20 MG/DL (ref 7–18)
BUN/CREAT SERPL: 20.8 (ref 10–20)
CALCIUM BLD-MCNC: 9.9 MG/DL (ref 8.5–10.1)
CHLORIDE SERPL-SCNC: 105 MMOL/L (ref 98–112)
CO2 SERPL-SCNC: 31 MMOL/L (ref 21–32)
CREAT BLD-MCNC: 0.96 MG/DL (ref 0.7–1.3)
GLUCOSE BLD-MCNC: 102 MG/DL (ref 70–99)
OSMOLALITY SERPL CALC.SUM OF ELEC: 285 MOSM/KG (ref 275–295)
PATIENT FASTING Y/N/NP: YES
POTASSIUM SERPL-SCNC: 4.7 MMOL/L (ref 3.5–5.1)
SODIUM SERPL-SCNC: 136 MMOL/L (ref 136–145)

## 2020-02-26 PROCEDURE — 36415 COLL VENOUS BLD VENIPUNCTURE: CPT

## 2020-02-26 PROCEDURE — 80048 BASIC METABOLIC PNL TOTAL CA: CPT

## 2020-03-10 ENCOUNTER — CARDPULM VISIT (OUTPATIENT)
Dept: CARDIAC REHAB | Facility: HOSPITAL | Age: 80
End: 2020-03-10
Attending: INTERNAL MEDICINE

## 2020-04-28 ENCOUNTER — HOSPITAL (OUTPATIENT)
Dept: OTHER | Age: 80
End: 2020-04-28

## 2020-05-01 ENCOUNTER — HOSPITAL (OUTPATIENT)
Dept: OTHER | Age: 80
End: 2020-05-01

## 2020-05-04 PROCEDURE — 99441 TELEPHONE E&M BY PHYSICIAN EST PT NOT ORIG PREV 7 DAYS 5-10 MIN: CPT | Performed by: INTERNAL MEDICINE

## 2020-05-13 ENCOUNTER — HOSPITAL ENCOUNTER (OUTPATIENT)
Dept: CV DIAGNOSTICS | Facility: HOSPITAL | Age: 80
Discharge: HOME | End: 2020-05-13
Attending: PHYSICIAN ASSISTANT
Payer: MEDICARE

## 2020-05-13 DIAGNOSIS — I42.8 OTHER CARDIOMYOPATHY (HCC): ICD-10-CM

## 2020-05-13 PROCEDURE — 93306 TTE W/DOPPLER COMPLETE: CPT | Performed by: PHYSICIAN ASSISTANT

## 2020-05-20 NOTE — PROGRESS NOTES
Melinda Dudley,     Your echocardiogram is stable from prior. Your aortic valve is working well.       RUTH Acosta

## 2020-06-01 ENCOUNTER — HOSPITAL (OUTPATIENT)
Dept: OTHER | Age: 80
End: 2020-06-01

## 2020-07-01 ENCOUNTER — HOSPITAL (OUTPATIENT)
Dept: OTHER | Age: 80
End: 2020-07-01
Attending: INTERNAL MEDICINE

## 2020-07-07 ENCOUNTER — PRIOR ORIGINAL RECORDS (OUTPATIENT)
Dept: OTHER | Age: 80
End: 2020-07-07

## 2020-07-07 PROCEDURE — 99212 OFFICE O/P EST SF 10 MIN: CPT | Performed by: INTERNAL MEDICINE

## 2020-08-01 ENCOUNTER — HOSPITAL (OUTPATIENT)
Dept: OTHER | Age: 80
End: 2020-08-01
Attending: INTERNAL MEDICINE

## 2020-09-01 ENCOUNTER — HOSPITAL (OUTPATIENT)
Dept: OTHER | Age: 80
End: 2020-09-01
Attending: INTERNAL MEDICINE

## 2020-10-09 LAB
ALBUMIN/GLOB SERPL: 1.3 (CALC) (ref 1–2.5)
ALBUMIN/GLOB SERPL: 1.4 (CALC) (ref 1–2.5)
ALBUMIN/GLOB SERPL: 1.4 (CALC) (ref 1–2.5)
ALBUMIN/GLOB SERPL: 1.5 (CALC) (ref 1–2.5)
ALBUMIN/GLOB SERPL: 1.5 (CALC) (ref 1–2.5)
ALBUMIN/GLOB SERPL: 1.6 (CALC) (ref 1–2.5)
ALBUMIN/GLOB SERPL: 1.9 (CALC) (ref 1–2.5)
ALBUMIN: 3.5 G/DL (ref 3.6–5.1)
ALBUMIN: 3.5 G/DL (ref 3.6–5.1)
ALBUMIN: 3.6 G/DL (ref 3.6–5.1)
ALBUMIN: 3.6 G/DL (ref 3.8–4.8)
ALBUMIN: 3.6 G/DL (ref 3.8–4.8)
ALBUMIN: 3.8 G/DL (ref 3.6–5.1)
ALBUMIN: 3.8 G/DL (ref 3.8–4.8)
ALBUMIN: 3.9 G/DL (ref 3.6–5.1)
ALBUMIN: 4 G/DL (ref 3.6–5.1)
ALBUMIN: 4 G/DL (ref 3.8–4.8)
ALBUMIN: 4.1 G/DL (ref 3.8–4.8)
ALBUMIN: 4.2 G/DL (ref 3.6–5.1)
ALKALINE PHOSPHATASE: 52 UNIT/L (ref 40–115)
ALKALINE PHOSPHATASE: 57 UNIT/L (ref 40–115)
ALKALINE PHOSPHATASE: 58 UNIT/L (ref 40–115)
ALKALINE PHOSPHATASE: 64 UNIT/L (ref 40–115)
ALKALINE PHOSPHATASE: 65 UNIT/L (ref 40–115)
ALKALINE PHOSPHATASE: 68 UNIT/L (ref 40–115)
ALKALINE PHOSPHATASE: 71 UNIT/L (ref 40–115)
ALPHA-1-GLOBULINS: 0.3 G/DL (ref 0.2–0.3)
ALPHA-2-GLOBULINS: 0.4 G/DL (ref 0.5–0.9)
ALT: 11 UNIT/L (ref 9–46)
ALT: 15 UNIT/L (ref 9–46)
ALT: 17 UNIT/L (ref 9–46)
ALT: 22 UNIT/L (ref 9–46)
ALT: 42 UNIT/L (ref 9–46)
AST: 20 UNIT/L (ref 10–35)
AST: 26 UNIT/L (ref 10–35)
AST: 28 UNIT/L (ref 10–35)
AST: 29 UNIT/L (ref 10–35)
AST: 36 UNIT/L (ref 10–35)
AST: 38 UNIT/L (ref 10–35)
AST: 40 UNIT/L (ref 10–35)
BASO%: 0.2 %
BASO%: 0.3 %
BASO%: 0.5 %
BASO%: 0.5 %
BASO: 0 10^3/UL
BETA 1 GLOBULIN: 0.3 G/DL (ref 0.4–0.6)
BETA 1 GLOBULIN: 0.3 G/DL (ref 0.4–0.6)
BETA 1 GLOBULIN: 0.4 G/DL (ref 0.4–0.6)
BETA 2 GLOBULIN: 0.3 G/DL (ref 0.2–0.5)
BILIRUBIN, TOTAL: 0.6 MG/DL (ref 0.2–1.2)
BILIRUBIN, TOTAL: 0.7 MG/DL (ref 0.2–1.2)
BILIRUBIN, TOTAL: 0.9 MG/DL (ref 0.2–1.2)
BUN/CREATININE RATIO: ABNORMAL (CALC) (ref 6–22)
BUN/CREATININE RATIO: NORMAL (CALC) (ref 6–22)
CALCIUM: 10 MG/DL (ref 8.6–10.3)
CALCIUM: 9.1 MG/DL (ref 8.6–10.3)
CALCIUM: 9.3 MG/DL (ref 8.6–10.3)
CALCIUM: 9.4 MG/DL (ref 8.6–10.3)
CALCIUM: 9.6 MG/DL (ref 8.6–10.3)
CALCIUM: 9.9 MG/DL (ref 8.6–10.3)
CALCIUM: 9.9 MG/DL (ref 8.6–10.3)
CARBON DIOXIDE: 21 MMOL/L (ref 20–31)
CARBON DIOXIDE: 24 MMOL/L (ref 20–32)
CARBON DIOXIDE: 25 MMOL/L (ref 20–31)
CARBON DIOXIDE: 25 MMOL/L (ref 20–32)
CARBON DIOXIDE: 30 MMOL/L (ref 20–32)
CHLORIDE: 102 MMOL/L (ref 98–110)
CHLORIDE: 103 MMOL/L (ref 98–110)
CHLORIDE: 103 MMOL/L (ref 98–110)
CHLORIDE: 104 MMOL/L (ref 98–110)
CHLORIDE: 104 MMOL/L (ref 98–110)
CHLORIDE: 105 MMOL/L (ref 98–110)
CHLORIDE: 105 MMOL/L (ref 98–110)
CRCL (C&G) (MOSAIQ HL): 71.18 ML/MIN
CRCL (C&G) (MOSAIQ HL): 71.54 ML/MIN
CRCL (C&G) (MOSAIQ HL): 72.54 ML/MIN
CRCL (C&G) (MOSAIQ HL): 73.18 ML/MIN
CRCL (C&G) (MOSAIQ HL): 75.6 ML/MIN
CRCL (C&G) (MOSAIQ HL): 76.86 ML/MIN
CRCL (C&G) (MOSAIQ HL): 81.98 ML/MIN
CREATININE CLEARANCE (MOSAIQ HL): 52.3 ML/MIN
CREATININE CLEARANCE (MOSAIQ HL): 52.7 ML/MIN
CREATININE CLEARANCE (MOSAIQ HL): 52.9 ML/MIN
CREATININE CLEARANCE (MOSAIQ HL): 54 ML/MIN
CREATININE CLEARANCE (MOSAIQ HL): 54.9 ML/MIN
CREATININE CLEARANCE (MOSAIQ HL): 55.8 ML/MIN
CREATININE CLEARANCE (MOSAIQ HL): 60.2 ML/MIN
CREATININE: 0.83 MG/DL (ref 0.7–1.11)
CREATININE: 0.91 MG/DL (ref 0.7–1.11)
CREATININE: 0.91 MG/DL (ref 0.7–1.18)
CREATININE: 0.93 MG/DL (ref 0.7–1.11)
CREATININE: 0.94 MG/DL (ref 0.7–1.11)
CREATININE: 0.94 MG/DL (ref 0.7–1.18)
CREATININE: 0.98 MG/DL (ref 0.7–1.18)
EGFR AFRICAN AMERICAN: 85 ML/MIN/1.73M2
EGFR AFRICAN AMERICAN: 88 ML/MIN/1.73M2
EGFR AFRICAN AMERICAN: 89 ML/MIN/1.73M2
EGFR AFRICAN AMERICAN: 89 ML/MIN/1.73M2
EGFR AFRICAN AMERICAN: 92 ML/MIN/1.73M2
EGFR AFRICAN AMERICAN: 93 ML/MIN/1.73M2
EGFR AFRICAN AMERICAN: 96 ML/MIN/1.73M2
EGFR NON-AFR. AMERICAN: 74 ML/MIN/1.73M2
EGFR NON-AFR. AMERICAN: 76 ML/MIN/1.73M2
EGFR NON-AFR. AMERICAN: 77 ML/MIN/1.73M2
EGFR NON-AFR. AMERICAN: 77 ML/MIN/1.73M2
EGFR NON-AFR. AMERICAN: 79 ML/MIN/1.73M2
EGFR NON-AFR. AMERICAN: 80 ML/MIN/1.73M2
EGFR NON-AFR. AMERICAN: 83 ML/MIN/1.73M2
EOS%: 2.8 %
EOS%: 2.9 %
EOS%: 3 %
EOS%: 3.4 %
EOS%: 3.7 %
EOS%: 4 %
EOS%: 5.6 %
EOS: 0.1 10^3/UL
EOS: 0.2 10^3/UL
EOS: 0.2 10^3/UL
FREE KAPPA, SERUM: 29.5 MG/L (ref 3.3–19.4)
FREE KAPPA, SERUM: 30.6 MG/L (ref 3.3–19.4)
FREE KAPPA, SERUM: 30.7 MG/L (ref 3.3–19.4)
FREE KAPPA, SERUM: 34.1 MG/L (ref 3.3–19.4)
FREE KAPPA, SERUM: 35.3 MG/L (ref 3.3–19.4)
FREE KAPPA/LAMBDA RATIO: 1.37 (ref 0.26–1.65)
FREE KAPPA/LAMBDA RATIO: 1.39 (ref 0.26–1.65)
FREE KAPPA/LAMBDA RATIO: 1.49 (ref 0.26–1.65)
FREE KAPPA/LAMBDA RATIO: 1.55 (ref 0.26–1.65)
FREE KAPPA/LAMBDA RATIO: 1.73 (ref 0.26–1.65)
FREE LAMBDA, SERUM: 19.7 MG/L (ref 5.7–26.3)
FREE LAMBDA, SERUM: 19.8 MG/L (ref 5.7–26.3)
FREE LAMBDA, SERUM: 21.6 MG/L (ref 5.7–26.3)
FREE LAMBDA, SERUM: 22.1 MG/L (ref 5.7–26.3)
FREE LAMBDA, SERUM: 23.7 MG/L (ref 5.7–26.3)
GAMMA GLOBULINS: 1 G/DL (ref 0.8–1.7)
GAMMA GLOBULINS: 1.1 G/DL (ref 0.8–1.7)
GAMMA GLOBULINS: 1.1 G/DL (ref 0.8–1.7)
GAMMA GLOBULINS: 1.2 G/DL (ref 0.8–1.7)
GAMMA GLOBULINS: 1.3 G/DL (ref 0.8–1.7)
GLOBULIN: 2.2 G/DL (CALC) (ref 1.9–3.7)
GLOBULIN: 2.4 G/DL (CALC) (ref 1.9–3.7)
GLOBULIN: 2.4 G/DL (CALC) (ref 1.9–3.7)
GLOBULIN: 2.5 G/DL (CALC) (ref 1.9–3.7)
GLOBULIN: 2.7 G/DL (CALC) (ref 1.9–3.7)
GLOBULIN: 2.8 G/DL (CALC) (ref 1.9–3.7)
GLOBULIN: 2.8 G/DL (CALC) (ref 1.9–3.7)
GLUCOSE: 106 MG/DL (ref 65–99)
GLUCOSE: 61 MG/DL (ref 65–99)
GLUCOSE: 75 MG/DL (ref 65–99)
GLUCOSE: 82 MG/DL (ref 65–99)
GLUCOSE: 82 MG/DL (ref 65–99)
GLUCOSE: 92 MG/DL (ref 65–99)
GLUCOSE: 99 MG/DL (ref 65–99)
HCT: 40.6 % (ref 38–54)
HCT: 41.1 % (ref 38–54)
HCT: 41.1 % (ref 38–54)
HCT: 41.4 % (ref 38–54)
HCT: 42.5 % (ref 38–54)
HCT: 42.8 % (ref 38–54)
HCT: 43 % (ref 38–54)
HGB: 12.8 G/DL (ref 12–18)
HGB: 13 G/DL (ref 12–18)
HGB: 13.3 G/DL (ref 12–18)
HGB: 13.4 G/DL (ref 12–18)
HGB: 13.7 G/DL (ref 12–18)
HGB: 14.1 G/DL (ref 12–18)
HGB: 14.2 G/DL (ref 12–18)
IG A: 175 MG/DL (ref 81–463)
IG A: 180 MG/DL (ref 20–320)
IG A: 189 MG/DL (ref 81–463)
IG A: 192 MG/DL (ref 81–463)
IG A: 214 MG/DL (ref 81–463)
IG G: 1202 MG/DL (ref 694–1618)
IG G: 1220 MG/DL (ref 694–1618)
IG G: 1342 MG/DL (ref 694–1618)
IG G: 1356 MG/DL (ref 600–1540)
IG G: 1382 MG/DL (ref 694–1618)
IG M: 104 MG/DL (ref 48–271)
IG M: 110 MG/DL (ref 48–271)
IG M: 68 MG/DL (ref 48–271)
IG M: 71 MG/DL (ref 50–300)
IG M: 74 MG/DL (ref 48–271)
INTERPRETATION: ABNORMAL
INTERPRETATION: NORMAL
LYMPH%: 14.4 % (ref 12–44)
LYMPH%: 15.8 % (ref 12–44)
LYMPH%: 17.4 % (ref 12–44)
LYMPH%: 17.7 % (ref 12–44)
LYMPH%: 18.1 % (ref 12–44)
LYMPH%: 18.7 % (ref 12–44)
LYMPH%: 19.8 % (ref 12–44)
LYMPH: 0.6 10^3/UL (ref 0.8–2.8)
LYMPH: 0.7 10^3/UL (ref 0.8–2.8)
LYMPH: 0.8 10^3/UL (ref 0.8–2.8)
MCH: 28.1 PG (ref 26–33)
MCH: 30.7 PG (ref 26–33)
MCH: 31.1 PG (ref 26–33)
MCH: 31.2 PG (ref 26–33)
MCH: 31.2 PG (ref 26–33)
MCH: 31.8 PG (ref 26–33)
MCH: 31.8 PG (ref 26–33)
MCHC: 31.1 G/DL (ref 31–36)
MCHC: 32 G/DL (ref 31–36)
MCHC: 32.2 G/DL (ref 31–36)
MCHC: 32.4 G/DL (ref 31–36)
MCHC: 32.4 G/DL (ref 31–36)
MCHC: 32.9 G/DL (ref 31–36)
MCHC: 33 G/DL (ref 31–36)
MCV: 90.1 FML (ref 82–100)
MCV: 94.3 FML (ref 82–100)
MCV: 94.7 FML (ref 82–100)
MCV: 94.9 FML (ref 82–100)
MCV: 96.3 FML (ref 82–100)
MCV: 98.6 FML (ref 82–100)
MCV: 99.3 FML (ref 82–100)
MONO%: 11.5 % (ref 2–12)
MONO%: 11.6 % (ref 2–12)
MONO%: 12 % (ref 2–12)
MONO%: 12.2 % (ref 2–12)
MONO%: 13.1 % (ref 2–12)
MONO%: 13.2 % (ref 2–12)
MONO%: 14.6 % (ref 2–12)
MONO: 0.4 10^3/UL (ref 0.2–1)
MONO: 0.4 10^3/UL (ref 0.2–1)
MONO: 0.5 10^3/UL (ref 0.2–1)
MONO: 0.6 10^3/UL (ref 0.2–1)
MONO: 0.6 10^3/UL (ref 0.2–1)
MPV: 10.4 FML (ref 8.6–11.7)
MPV: 10.6 FML (ref 8.6–11.7)
MPV: 10.7 FML (ref 8.6–11.7)
MPV: 10.8 FML (ref 8.6–11.7)
MPV: 10.9 FML (ref 8.6–11.7)
MPV: 11.3 FML (ref 8.6–11.7)
MPV: 12.3 FML (ref 8.6–11.7)
NEUT%: 61.1 % (ref 47–76)
NEUT%: 62.9 % (ref 47–76)
NEUT%: 66.6 % (ref 47–76)
NEUT%: 66.8 % (ref 47–76)
NEUT%: 67.1 % (ref 47–76)
NEUT%: 68 % (ref 47–76)
NEUT%: 69.9 % (ref 47–76)
NEUT: 2.4 10^3/UL (ref 1.5–7.1)
NEUT: 2.6 10^3/UL (ref 1.5–7.1)
NEUT: 2.7 10^3/UL (ref 1.5–7.1)
NEUT: 2.8 10^3/UL (ref 1.5–7.1)
NEUT: 3 10^3/UL (ref 1.5–7.1)
PLT: 107 10^3/UL (ref 150–375)
PLT: 113 10^3/UL (ref 150–375)
PLT: 117 10^3/UL (ref 150–375)
PLT: 119 10^3/UL (ref 150–375)
PLT: 122 10^3/UL (ref 150–375)
PLT: 124 10^3/UL (ref 150–375)
PLT: 128 10^3/UL (ref 150–375)
POTASSIUM: 4.4 MMOL/L (ref 3.5–5.3)
POTASSIUM: 4.5 MMOL/L (ref 3.5–5.3)
POTASSIUM: 4.6 MMOL/L (ref 3.5–5.3)
POTASSIUM: 4.7 MMOL/L (ref 3.5–5.3)
POTASSIUM: 4.8 MMOL/L (ref 3.5–5.3)
PROTEIN, TOTAL: 5.9 G/DL (ref 6.1–8.1)
PROTEIN, TOTAL: 5.9 G/DL (ref 6.1–8.1)
PROTEIN, TOTAL: 6 G/DL (ref 6.1–8.1)
PROTEIN, TOTAL: 6 G/DL (ref 6.1–8.1)
PROTEIN, TOTAL: 6.2 G/DL (ref 6.1–8.1)
PROTEIN, TOTAL: 6.2 G/DL (ref 6.1–8.1)
PROTEIN, TOTAL: 6.4 G/DL (ref 6.1–8.1)
PROTEIN, TOTAL: 6.4 G/DL (ref 6.1–8.1)
PROTEIN, TOTAL: 6.5 G/DL (ref 6.1–8.1)
PROTEIN, TOTAL: 6.5 G/DL (ref 6.1–8.1)
PROTEIN, TOTAL: 6.6 G/DL (ref 6.1–8.1)
PROTEIN, TOTAL: 6.6 G/DL (ref 6.1–8.1)
PROTEIN, TOTAL: 6.7 G/DL (ref 6.1–8.1)
PROTEIN, TOTAL: 6.7 G/DL (ref 6.1–8.1)
RBC: 4.09 10^6/UL (ref 4.2–6.2)
RBC: 4.3 10^6/UL (ref 4.2–6.2)
RBC: 4.31 10^6/UL (ref 4.2–6.2)
RBC: 4.33 10^6/UL (ref 4.2–6.2)
RBC: 4.52 10^6/UL (ref 4.2–6.2)
RBC: 4.56 10^6/UL (ref 4.2–6.2)
RBC: 4.56 10^6/UL (ref 4.2–6.2)
RDW-CV: 13.8 %
RDW-CV: 13.9 %
RDW-CV: 14 %
RDW-CV: 14 %
RDW-CV: 14.1 %
RDW-CV: 14.6 %
RDW-CV: 17.9 %
RDW-SD: 46.9 FML (ref 36–50)
RDW-SD: 47.2 FML (ref 36–50)
RDW-SD: 47.4 FML (ref 36–50)
RDW-SD: 47.5 FML (ref 36–50)
RDW-SD: 49.9 FML (ref 36–50)
RDW-SD: 52 FML (ref 36–50)
RDW-SD: 57.8 FML (ref 36–50)
SODIUM: 137 MMOL/L (ref 135–146)
SODIUM: 138 MMOL/L (ref 135–146)
SODIUM: 139 MMOL/L (ref 135–146)
SODIUM: 140 MMOL/L (ref 135–146)
SODIUM: 141 MMOL/L (ref 135–146)
UREA NITROGEN (BUN): 20 MG/DL (ref 7–25)
UREA NITROGEN (BUN): 22 MG/DL (ref 7–25)
UREA NITROGEN (BUN): 25 MG/DL (ref 7–25)
WBC: 3.6 10^3/UL (ref 4.3–11)
WBC: 3.8 10^3/UL (ref 4.3–11)
WBC: 4.1 10^3/UL (ref 4.3–11)
WBC: 4.1 10^3/UL (ref 4.3–11)
WBC: 4.2 10^3/UL (ref 4.3–11)
WBC: 4.3 10^3/UL (ref 4.3–11)
WBC: 4.4 10^3/UL (ref 4.3–11)

## 2020-10-11 VITALS
DIASTOLIC BLOOD PRESSURE: 80 MMHG | SYSTOLIC BLOOD PRESSURE: 128 MMHG | BODY MASS INDEX: 24.01 KG/M2 | WEIGHT: 181.99 LBS

## 2020-10-11 VITALS
WEIGHT: 180.01 LBS | SYSTOLIC BLOOD PRESSURE: 123 MMHG | BODY MASS INDEX: 23.86 KG/M2 | HEIGHT: 73 IN | DIASTOLIC BLOOD PRESSURE: 77 MMHG

## 2020-10-11 VITALS
SYSTOLIC BLOOD PRESSURE: 117 MMHG | BODY MASS INDEX: 23.62 KG/M2 | DIASTOLIC BLOOD PRESSURE: 83 MMHG | WEIGHT: 178.99 LBS

## 2020-10-11 VITALS
DIASTOLIC BLOOD PRESSURE: 86 MMHG | BODY MASS INDEX: 24.12 KG/M2 | SYSTOLIC BLOOD PRESSURE: 124 MMHG | WEIGHT: 181.99 LBS | HEIGHT: 73 IN

## 2020-10-11 VITALS
DIASTOLIC BLOOD PRESSURE: 78 MMHG | WEIGHT: 181.99 LBS | BODY MASS INDEX: 24.01 KG/M2 | SYSTOLIC BLOOD PRESSURE: 130 MMHG

## 2020-10-11 VITALS
BODY MASS INDEX: 23.86 KG/M2 | DIASTOLIC BLOOD PRESSURE: 76 MMHG | HEIGHT: 73 IN | SYSTOLIC BLOOD PRESSURE: 126 MMHG | WEIGHT: 180.01 LBS

## 2020-10-11 VITALS
BODY MASS INDEX: 23.62 KG/M2 | DIASTOLIC BLOOD PRESSURE: 82 MMHG | WEIGHT: 178.99 LBS | SYSTOLIC BLOOD PRESSURE: 126 MMHG

## 2020-10-13 VITALS — WEIGHT: 175.4 LBS | SYSTOLIC BLOOD PRESSURE: 134 MMHG | DIASTOLIC BLOOD PRESSURE: 94 MMHG | BODY MASS INDEX: 23.14 KG/M2

## 2020-10-13 LAB
BASO%: 0.5 %
BASO: 0 10^3/UL
EOS%: 4.2 %
EOS: 0.2 10^3/UL
HCT: 43.3 % (ref 38–54)
HGB: 13.9 G/DL (ref 12–18)
LYMPH%: 14.5 % (ref 12–44)
LYMPH: 0.6 10^3/UL (ref 0.8–2.8)
MCH: 31.8 PG (ref 26–33)
MCHC: 32.1 G/DL (ref 31–36)
MCV: 99.1 FML (ref 82–100)
MONO%: 13 % (ref 2–12)
MONO: 0.5 10^3/UL (ref 0.2–1)
MPV: 10.8 FML (ref 8.6–11.7)
NEUT%: 67.8 % (ref 47–76)
NEUT: 2.8 10^3/UL (ref 1.5–7.1)
PLT: 92 10^3/UL (ref 150–375)
RBC: 4.37 10^6/UL (ref 4.2–6.2)
RDW-CV: 13.5 %
RDW-SD: 48.4 FML (ref 36–50)
WBC: 4.1 10^3/UL (ref 4.3–11)

## 2020-12-31 ENCOUNTER — PRIOR ORIGINAL RECORDS (OUTPATIENT)
Dept: OTHER | Age: 80
End: 2020-12-31

## 2021-01-12 ENCOUNTER — OFFICE VISIT (OUTPATIENT)
Dept: HEMATOLOGY/ONCOLOGY | Age: 81
End: 2021-01-12
Attending: INTERNAL MEDICINE

## 2021-01-12 ENCOUNTER — HOSPITAL ENCOUNTER (OUTPATIENT)
Dept: INFUSION THERAPY | Age: 81
Discharge: HOME | End: 2021-01-12
Attending: INTERNAL MEDICINE

## 2021-01-12 ENCOUNTER — APPOINTMENT (OUTPATIENT)
Dept: INFUSION THERAPY | Age: 81
End: 2021-01-12
Attending: INTERNAL MEDICINE

## 2021-01-12 VITALS
WEIGHT: 178.8 LBS | HEART RATE: 81 BPM | SYSTOLIC BLOOD PRESSURE: 143 MMHG | BODY MASS INDEX: 23.59 KG/M2 | OXYGEN SATURATION: 98 % | DIASTOLIC BLOOD PRESSURE: 100 MMHG

## 2021-01-12 DIAGNOSIS — D47.2 MGUS (MONOCLONAL GAMMOPATHY OF UNKNOWN SIGNIFICANCE): ICD-10-CM

## 2021-01-12 DIAGNOSIS — D69.6 THROMBOCYTOPENIA (CMD): ICD-10-CM

## 2021-01-12 DIAGNOSIS — D69.6 THROMBOCYTOPENIA (CMD): Primary | ICD-10-CM

## 2021-01-12 LAB
ALBUMIN SERPL-MCNC: 3.9 G/DL (ref 3.6–5.1)
ALBUMIN/GLOB SERPL: 1.1 {RATIO} (ref 1–2.4)
ALP SERPL-CCNC: 76 UNITS/L (ref 45–117)
ALT SERPL-CCNC: 29 UNITS/L
ANION GAP SERPL CALC-SCNC: 6 MMOL/L (ref 10–20)
AST SERPL-CCNC: 26 UNITS/L
BASOPHILS # BLD: 0 K/MCL (ref 0–0.3)
BASOPHILS NFR BLD: 1 %
BILIRUB SERPL-MCNC: 0.6 MG/DL (ref 0.2–1)
BUN SERPL-MCNC: 25 MG/DL (ref 6–20)
BUN/CREAT SERPL: 27 (ref 7–25)
CALCIUM SERPL-MCNC: 9.8 MG/DL (ref 8.4–10.2)
CHLORIDE SERPL-SCNC: 104 MMOL/L (ref 98–107)
CO2 SERPL-SCNC: 32 MMOL/L (ref 21–32)
CREAT SERPL-MCNC: 0.92 MG/DL (ref 0.67–1.17)
DEPRECATED RDW RBC: 47.8 FL (ref 39–50)
EOSINOPHIL # BLD: 0.2 K/MCL (ref 0–0.5)
EOSINOPHIL NFR BLD: 5 %
ERYTHROCYTE [DISTWIDTH] IN BLOOD: 13.1 % (ref 11–15)
FASTING DURATION TIME PATIENT: ABNORMAL H
GFR SERPLBLD BASED ON 1.73 SQ M-ARVRAT: 77 ML/MIN/1.73M2
GLOBULIN SER-MCNC: 3.7 G/DL (ref 2–4)
GLUCOSE SERPL-MCNC: 71 MG/DL (ref 65–99)
HCT VFR BLD CALC: 48.5 % (ref 39–51)
HGB BLD-MCNC: 15.3 G/DL (ref 13–17)
IMM GRANULOCYTES # BLD AUTO: 0 K/MCL (ref 0–0.2)
IMM GRANULOCYTES # BLD: 0 %
LYMPHOCYTES # BLD: 0.9 K/MCL (ref 1–4)
LYMPHOCYTES NFR BLD: 19 %
MCH RBC QN AUTO: 31 PG (ref 26–34)
MCHC RBC AUTO-ENTMCNC: 31.5 G/DL (ref 32–36.5)
MCV RBC AUTO: 98.4 FL (ref 78–100)
MONOCYTES # BLD: 0.6 K/MCL (ref 0.3–0.9)
MONOCYTES NFR BLD: 13 %
NEUTROPHILS # BLD: 2.9 K/MCL (ref 1.8–7.7)
NEUTROPHILS NFR BLD: 62 %
NRBC BLD MANUAL-RTO: 0 /100 WBC
PLATELET # BLD AUTO: 99 K/MCL (ref 140–450)
POTASSIUM SERPL-SCNC: 4.2 MMOL/L (ref 3.4–5.1)
PROT SERPL-MCNC: 7.6 G/DL (ref 6.4–8.2)
RBC # BLD: 4.93 MIL/MCL (ref 4.5–5.9)
SODIUM SERPL-SCNC: 138 MMOL/L (ref 135–145)
WBC # BLD: 4.7 K/MCL (ref 4.2–11)

## 2021-01-12 PROCEDURE — 36415 COLL VENOUS BLD VENIPUNCTURE: CPT | Performed by: INTERNAL MEDICINE

## 2021-01-12 PROCEDURE — 85025 COMPLETE CBC W/AUTO DIFF WBC: CPT | Performed by: INTERNAL MEDICINE

## 2021-01-12 PROCEDURE — 99213 OFFICE O/P EST LOW 20 MIN: CPT | Performed by: INTERNAL MEDICINE

## 2021-01-12 PROCEDURE — 80053 COMPREHEN METABOLIC PANEL: CPT | Performed by: INTERNAL MEDICINE

## 2021-01-12 PROCEDURE — 84155 ASSAY OF PROTEIN SERUM: CPT | Performed by: INTERNAL MEDICINE

## 2021-01-12 PROCEDURE — 83520 IMMUNOASSAY QUANT NOS NONAB: CPT | Performed by: INTERNAL MEDICINE

## 2021-01-12 ASSESSMENT — ENCOUNTER SYMPTOMS
NEUROLOGICAL NEGATIVE: 1
ENDOCRINE NEGATIVE: 1
EYES NEGATIVE: 1
PSYCHIATRIC NEGATIVE: 1
RESPIRATORY NEGATIVE: 1
GASTROINTESTINAL NEGATIVE: 1
HEMATOLOGIC/LYMPHATIC NEGATIVE: 1
ALLERGIC/IMMUNOLOGIC NEGATIVE: 1

## 2021-01-12 ASSESSMENT — PATIENT HEALTH QUESTIONNAIRE - PHQ9
SUM OF ALL RESPONSES TO PHQ9 QUESTIONS 1 AND 2: 0
1. LITTLE INTEREST OR PLEASURE IN DOING THINGS: NOT AT ALL
CLINICAL INTERPRETATION OF PHQ2 SCORE: NO FURTHER SCREENING NEEDED
CLINICAL INTERPRETATION OF PHQ9 SCORE: NO FURTHER SCREENING NEEDED
2. FEELING DOWN, DEPRESSED OR HOPELESS: NOT AT ALL
SUM OF ALL RESPONSES TO PHQ9 QUESTIONS 1 AND 2: 0

## 2021-01-13 LAB
ALBUMIN SERPL ELPH-MCNC: 4.4 G/DL (ref 3.5–4.9)
ALPHA 1: 0.3 G/DL (ref 0.2–0.4)
ALPHA2 GLOB SERPL ELPH-MCNC: 0.6 G/DL (ref 0.5–0.9)
B-GLOBULIN SERPL ELPH-MCNC: 0.7 G/DL (ref 0.7–1.2)
GAMMA GLOB SERPL ELPH-MCNC: 1.4 G/DL (ref 0.7–1.7)
GLOBULIN SER-MCNC: 3 G/DL (ref 2.1–4.2)
KAPPA LC FREE SER NEPH-MCNC: 3.37 MG/DL (ref 0.33–1.94)
KAPPA LC/LAMBDA SER: 1.38 {RATIO} (ref 0.26–1.65)
LAMBDA LC FREE SER NEPH-MCNC: 2.44 MG/DL (ref 0.57–2.63)
PATH INTERP SPEC-IMP: ABNORMAL
PROT SERPL-MCNC: 7.4 G/DL (ref 6.4–8.2)

## 2021-02-03 DIAGNOSIS — Z23 NEED FOR VACCINATION: ICD-10-CM

## 2021-02-05 ENCOUNTER — IMMUNIZATION (OUTPATIENT)
Dept: LAB | Age: 81
End: 2021-02-05
Attending: HOSPITALIST
Payer: MEDICARE

## 2021-02-05 DIAGNOSIS — Z23 NEED FOR VACCINATION: Primary | ICD-10-CM

## 2021-02-05 PROCEDURE — 0001A SARSCOV2 VAC 30MCG/0.3ML IM: CPT

## 2021-02-26 ENCOUNTER — IMMUNIZATION (OUTPATIENT)
Dept: LAB | Age: 81
End: 2021-02-26
Attending: HOSPITALIST
Payer: MEDICARE

## 2021-02-26 DIAGNOSIS — Z23 NEED FOR VACCINATION: Primary | ICD-10-CM

## 2021-02-26 PROCEDURE — 0002A SARSCOV2 VAC 30MCG/0.3ML IM: CPT

## 2021-03-23 PROBLEM — I48.0 PAF (PAROXYSMAL ATRIAL FIBRILLATION) (HCC): Status: ACTIVE | Noted: 2021-03-23

## 2021-03-23 PROBLEM — I42.0 DILATED CARDIOMYOPATHY (HCC): Status: ACTIVE | Noted: 2021-03-23

## 2021-03-23 PROBLEM — Z98.890 S/P TVR (TRICUSPID VALVE REPAIR): Status: ACTIVE | Noted: 2019-05-31

## 2021-04-19 NOTE — CONSULTS
Problem: Activity/Exercise Intolerance  Goal: Patient will tolerate activity/exercise  Intervention: Progress activity per Cardiac Rehab protocol  Note: Intervention Status  Done  Intervention: Progressive graded ambulation  Note: Intervention Status  Done  Intervention: Upper and lower extremity calisthenics per Cardiac Rehab protocol  Note: Intervention Status  Done  Intervention: Monitor and document progressive activity response  Note: Intervention Status  Done  Intervention: Encourage hourly incentive spirometry, cough and deep breathe  Note: Intervention Status  Done      [facility]  Report of Consultation    Faye Murray Patient Status:  Observation    3/25/1940 MRN [id number]   [facility] 3SW-A Attending Mitch Lynch MD   Hosp Day # 1 PCP Caren Lew MD     Reason for Consultation:  GROSS HEM drinks alcohol. He reports that he does not use illicit drugs.     Allergies:  No Known Allergies    Medications:    Current facility-administered medications:   •  lactated ringers infusion, , Intravenous, Continuous  •  sodium chloride 0.9 % irrigation 3, no JVD, supple, symmetrical, trachea midline and thyroid not enlarged, symmetric, no tenderness/mass/nodules  Back: symmetric, no curvature. ROM normal. No CVA tenderness.   Lungs: clear to auscultation bilaterally  Heart: regular rate and rhythm  Abdomen: participate in the care of your patient.     Avani Guzman  6/18/2017  10:56 AM

## 2021-07-14 ENCOUNTER — HOSPITAL ENCOUNTER (OUTPATIENT)
Dept: LAB | Age: 81
Discharge: STILL ADMITTED OR ADMINISTRATIVE | End: 2021-07-14
Attending: INTERNAL MEDICINE

## 2021-07-14 ENCOUNTER — TELEPHONE (OUTPATIENT)
Dept: HEMATOLOGY/ONCOLOGY | Age: 81
End: 2021-07-14

## 2021-07-14 ENCOUNTER — OFFICE VISIT (OUTPATIENT)
Dept: HEMATOLOGY/ONCOLOGY | Age: 81
End: 2021-07-14
Attending: INTERNAL MEDICINE

## 2021-07-14 VITALS
BODY MASS INDEX: 23.72 KG/M2 | SYSTOLIC BLOOD PRESSURE: 127 MMHG | HEIGHT: 73 IN | WEIGHT: 179 LBS | OXYGEN SATURATION: 99 % | HEART RATE: 74 BPM | DIASTOLIC BLOOD PRESSURE: 84 MMHG

## 2021-07-14 DIAGNOSIS — D69.6 THROMBOCYTOPENIA (CMD): Primary | ICD-10-CM

## 2021-07-14 DIAGNOSIS — D69.6 THROMBOCYTOPENIA (CMD): ICD-10-CM

## 2021-07-14 DIAGNOSIS — D47.2 MGUS (MONOCLONAL GAMMOPATHY OF UNKNOWN SIGNIFICANCE): ICD-10-CM

## 2021-07-14 LAB
ALBUMIN SERPL-MCNC: 3.6 G/DL (ref 3.6–5.1)
ALBUMIN/GLOB SERPL: 1 {RATIO} (ref 1–2.4)
ALP SERPL-CCNC: 66 UNITS/L (ref 45–117)
ALT SERPL-CCNC: 22 UNITS/L
ANION GAP SERPL CALC-SCNC: 6 MMOL/L (ref 10–20)
AST SERPL-CCNC: 24 UNITS/L
BASOPHILS # BLD: 0 K/MCL (ref 0–0.3)
BASOPHILS NFR BLD: 1 %
BILIRUB SERPL-MCNC: 0.6 MG/DL (ref 0.2–1)
BUN SERPL-MCNC: 20 MG/DL (ref 6–20)
BUN/CREAT SERPL: 21 (ref 7–25)
CALCIUM SERPL-MCNC: 10 MG/DL (ref 8.4–10.2)
CHLORIDE SERPL-SCNC: 104 MMOL/L (ref 98–107)
CO2 SERPL-SCNC: 33 MMOL/L (ref 21–32)
CREAT SERPL-MCNC: 0.95 MG/DL (ref 0.67–1.17)
DEPRECATED RDW RBC: 46.5 FL (ref 39–50)
EOSINOPHIL # BLD: 0.1 K/MCL (ref 0–0.5)
EOSINOPHIL NFR BLD: 3 %
ERYTHROCYTE [DISTWIDTH] IN BLOOD: 13.1 % (ref 11–15)
FASTING DURATION TIME PATIENT: ABNORMAL H
GFR SERPLBLD BASED ON 1.73 SQ M-ARVRAT: 74 ML/MIN/1.73M2
GLOBULIN SER-MCNC: 3.7 G/DL (ref 2–4)
GLUCOSE SERPL-MCNC: 101 MG/DL (ref 65–99)
HCT VFR BLD CALC: 47.2 % (ref 39–51)
HGB BLD-MCNC: 15.3 G/DL (ref 13–17)
IMM GRANULOCYTES # BLD AUTO: 0 K/MCL (ref 0–0.2)
IMM GRANULOCYTES # BLD: 1 %
LYMPHOCYTES # BLD: 0.7 K/MCL (ref 1–4)
LYMPHOCYTES NFR BLD: 16 %
MCH RBC QN AUTO: 31.5 PG (ref 26–34)
MCHC RBC AUTO-ENTMCNC: 32.4 G/DL (ref 32–36.5)
MCV RBC AUTO: 97.3 FL (ref 78–100)
MONOCYTES # BLD: 0.4 K/MCL (ref 0.3–0.9)
MONOCYTES NFR BLD: 10 %
NEUTROPHILS # BLD: 2.9 K/MCL (ref 1.8–7.7)
NEUTROPHILS NFR BLD: 69 %
NRBC BLD MANUAL-RTO: 0 /100 WBC
PLATELET # BLD AUTO: 98 K/MCL (ref 140–450)
POTASSIUM SERPL-SCNC: 4.6 MMOL/L (ref 3.4–5.1)
PROT SERPL-MCNC: 7.3 G/DL (ref 6.4–8.2)
RBC # BLD: 4.85 MIL/MCL (ref 4.5–5.9)
SODIUM SERPL-SCNC: 138 MMOL/L (ref 135–145)
WBC # BLD: 4.2 K/MCL (ref 4.2–11)

## 2021-07-14 PROCEDURE — 83520 IMMUNOASSAY QUANT NOS NONAB: CPT | Performed by: INTERNAL MEDICINE

## 2021-07-14 PROCEDURE — 36415 COLL VENOUS BLD VENIPUNCTURE: CPT | Performed by: INTERNAL MEDICINE

## 2021-07-14 PROCEDURE — 85025 COMPLETE CBC W/AUTO DIFF WBC: CPT | Performed by: INTERNAL MEDICINE

## 2021-07-14 PROCEDURE — 80053 COMPREHEN METABOLIC PANEL: CPT | Performed by: INTERNAL MEDICINE

## 2021-07-14 PROCEDURE — 99213 OFFICE O/P EST LOW 20 MIN: CPT | Performed by: INTERNAL MEDICINE

## 2021-07-14 PROCEDURE — 84155 ASSAY OF PROTEIN SERUM: CPT | Performed by: INTERNAL MEDICINE

## 2021-07-14 RX ORDER — POLYETHYLENE GLYCOL 3350 17 G/17G
17 POWDER, FOR SOLUTION ORAL DAILY
COMMUNITY
Start: 2019-06-02

## 2021-07-14 RX ORDER — DIGOXIN 125 MCG
TABLET ORAL
COMMUNITY
Start: 2021-05-19

## 2021-07-14 ASSESSMENT — ENCOUNTER SYMPTOMS
ENDOCRINE NEGATIVE: 1
RESPIRATORY NEGATIVE: 1
GASTROINTESTINAL NEGATIVE: 1
EYES NEGATIVE: 1
PSYCHIATRIC NEGATIVE: 1
ALLERGIC/IMMUNOLOGIC NEGATIVE: 1
HEMATOLOGIC/LYMPHATIC NEGATIVE: 1
NEUROLOGICAL NEGATIVE: 1

## 2021-07-14 ASSESSMENT — PATIENT HEALTH QUESTIONNAIRE - PHQ9
CLINICAL INTERPRETATION OF PHQ2 SCORE: NO FURTHER SCREENING NEEDED
SUM OF ALL RESPONSES TO PHQ9 QUESTIONS 1 AND 2: 0
SUM OF ALL RESPONSES TO PHQ9 QUESTIONS 1 AND 2: 0
1. LITTLE INTEREST OR PLEASURE IN DOING THINGS: NOT AT ALL
CLINICAL INTERPRETATION OF PHQ9 SCORE: NO FURTHER SCREENING NEEDED
2. FEELING DOWN, DEPRESSED OR HOPELESS: NOT AT ALL

## 2021-07-15 LAB
ALBUMIN SERPL ELPH-MCNC: 4.1 G/DL (ref 3.5–4.9)
ALPHA 1: 0.3 G/DL (ref 0.2–0.4)
ALPHA2 GLOB SERPL ELPH-MCNC: 0.6 G/DL (ref 0.5–0.9)
B-GLOBULIN SERPL ELPH-MCNC: 0.7 G/DL (ref 0.7–1.2)
GAMMA GLOB SERPL ELPH-MCNC: 1.3 G/DL (ref 0.7–1.7)
GLOBULIN SER-MCNC: 2.9 G/DL (ref 2.1–4.2)
KAPPA LC FREE SER NEPH-MCNC: 3.35 MG/DL (ref 0.33–1.94)
KAPPA LC/LAMBDA SER: 1.27 {RATIO} (ref 0.26–1.65)
LAMBDA LC FREE SER NEPH-MCNC: 2.64 MG/DL (ref 0.57–2.63)
PATH INTERP SPEC-IMP: ABNORMAL
PROT SERPL-MCNC: 7 G/DL (ref 6.4–8.2)

## 2022-01-03 ENCOUNTER — OFFICE VISIT (OUTPATIENT)
Dept: HEMATOLOGY/ONCOLOGY | Facility: HOSPITAL | Age: 82
End: 2022-01-03
Attending: INTERNAL MEDICINE
Payer: MEDICARE

## 2022-01-03 VITALS
RESPIRATION RATE: 18 BRPM | DIASTOLIC BLOOD PRESSURE: 98 MMHG | WEIGHT: 182 LBS | SYSTOLIC BLOOD PRESSURE: 157 MMHG | OXYGEN SATURATION: 98 % | TEMPERATURE: 98 F | HEART RATE: 82 BPM | BODY MASS INDEX: 24 KG/M2

## 2022-01-03 DIAGNOSIS — D47.2 MGUS (MONOCLONAL GAMMOPATHY OF UNKNOWN SIGNIFICANCE): ICD-10-CM

## 2022-01-03 DIAGNOSIS — D69.6 THROMBOCYTOPENIA (HCC): Primary | ICD-10-CM

## 2022-01-03 LAB
BASOPHILS # BLD AUTO: 0.04 X10(3) UL (ref 0–0.2)
BASOPHILS NFR BLD AUTO: 0.8 %
EOSINOPHIL # BLD AUTO: 0.17 X10(3) UL (ref 0–0.7)
EOSINOPHIL NFR BLD AUTO: 3.4 %
ERYTHROCYTE [DISTWIDTH] IN BLOOD BY AUTOMATED COUNT: 13 %
HCT VFR BLD AUTO: 48.8 %
HGB BLD-MCNC: 15.6 G/DL
IMM GRANULOCYTES # BLD AUTO: 0.02 X10(3) UL (ref 0–1)
IMM GRANULOCYTES NFR BLD: 0.4 %
LYMPHOCYTES # BLD AUTO: 0.88 X10(3) UL (ref 1–4)
LYMPHOCYTES NFR BLD AUTO: 17.6 %
MCH RBC QN AUTO: 31.3 PG (ref 26–34)
MCHC RBC AUTO-ENTMCNC: 32 G/DL (ref 31–37)
MCV RBC AUTO: 98 FL
MONOCYTES # BLD AUTO: 0.54 X10(3) UL (ref 0.1–1)
MONOCYTES NFR BLD AUTO: 10.8 %
NEUTROPHILS # BLD AUTO: 3.35 X10 (3) UL (ref 1.5–7.7)
NEUTROPHILS # BLD AUTO: 3.35 X10(3) UL (ref 1.5–7.7)
NEUTROPHILS NFR BLD AUTO: 67 %
PLATELET # BLD AUTO: 102 10(3)UL (ref 150–450)
RBC # BLD AUTO: 4.98 X10(6)UL
WBC # BLD AUTO: 5 X10(3) UL (ref 4–11)

## 2022-01-03 PROCEDURE — 99205 OFFICE O/P NEW HI 60 MIN: CPT | Performed by: INTERNAL MEDICINE

## 2022-01-03 NOTE — CONSULTS
[facility] Report of Consultation    Patient Name: Kenji Cooley   YOB: 1940   Medical Record Number: [id number]   CSN: [id number]   Consulting Physician: Alva Venegas M.D.    Referring Physician: Young Caldwell    Date of Consul term current use of anticoagulant    • Mitral regurgitation    • Pacemaker    • Prostatitis, unspecified    • Rheumatic fever    • Sick sinus syndrome [facility])    • Status post aortic valve replacement        Past Surgical History:  Past Surgical History:   P Caffeine Concern: Not Asked        Occupational Exposure: Not Asked        Hobby Hazards: Not Asked        Sleep Concern: Not Asked        Stress Concern: Not Asked        Weight Concern: Not Asked        Special Diet: Not Asked        Back Care: Not Asked Respiratory Normal - No dyspnea on exertion, chest pain, cough or hemoptysis. Cardiovascular Normal - No anginal chest pain, palpitations or orthopnea. Gastrointestinal Normal - No nausea, vomiting, diarrhea, GI bleeding, or constipation.  No change i edema. Pulses 4+ and equal bilaterally. Integumentary Normal - No rashes, scars, or lesions suggestive of malignancy. Neurologic Normal - No sensory or motor deficits, normal cerebellar function, normal gait, cranial nerves intact.    Psychiatric Normal

## 2022-01-06 LAB
ALBUMIN SERPL ELPH-MCNC: 4.11 G/DL (ref 3.75–5.21)
ALBUMIN/GLOB SERPL: 1.38 {RATIO} (ref 1–2)
ALPHA1 GLOB SERPL ELPH-MCNC: 0.33 G/DL (ref 0.19–0.46)
ALPHA2 GLOB SERPL ELPH-MCNC: 0.64 G/DL (ref 0.48–1.05)
B-GLOBULIN SERPL ELPH-MCNC: 0.73 G/DL (ref 0.68–1.23)
GAMMA GLOB SERPL ELPH-MCNC: 1.29 G/DL (ref 0.62–1.7)
KAPPA LC FREE SER-MCNC: 3.51 MG/DL (ref 0.33–1.94)
KAPPA LC FREE/LAMBDA FREE SER NEPH: 1.49 {RATIO} (ref 0.26–1.65)
LAMBDA LC FREE SERPL-MCNC: 2.35 MG/DL (ref 0.57–2.63)
PROT SERPL-MCNC: 7.1 G/DL (ref 6.4–8.2)

## 2022-07-01 ENCOUNTER — NURSE ONLY (OUTPATIENT)
Dept: HEMATOLOGY/ONCOLOGY | Facility: HOSPITAL | Age: 82
End: 2022-07-01
Attending: INTERNAL MEDICINE
Payer: MEDICARE

## 2022-07-01 DIAGNOSIS — D69.6 THROMBOCYTOPENIA (HCC): ICD-10-CM

## 2022-07-01 DIAGNOSIS — D47.2 MGUS (MONOCLONAL GAMMOPATHY OF UNKNOWN SIGNIFICANCE): ICD-10-CM

## 2022-07-01 LAB
BASOPHILS # BLD AUTO: 0.03 X10(3) UL (ref 0–0.2)
BASOPHILS NFR BLD AUTO: 0.6 %
EOSINOPHIL # BLD AUTO: 0.16 X10(3) UL (ref 0–0.7)
EOSINOPHIL NFR BLD AUTO: 3.2 %
ERYTHROCYTE [DISTWIDTH] IN BLOOD BY AUTOMATED COUNT: 13.2 %
HCT VFR BLD AUTO: 47.2 %
HGB BLD-MCNC: 15.6 G/DL
IMM GRANULOCYTES # BLD AUTO: 0.01 X10(3) UL (ref 0–1)
IMM GRANULOCYTES NFR BLD: 0.2 %
LYMPHOCYTES # BLD AUTO: 0.83 X10(3) UL (ref 1–4)
LYMPHOCYTES NFR BLD AUTO: 16.8 %
MCH RBC QN AUTO: 32.2 PG (ref 26–34)
MCHC RBC AUTO-ENTMCNC: 33.1 G/DL (ref 31–37)
MCV RBC AUTO: 97.3 FL
MONOCYTES # BLD AUTO: 0.61 X10(3) UL (ref 0.1–1)
MONOCYTES NFR BLD AUTO: 12.3 %
NEUTROPHILS # BLD AUTO: 3.3 X10 (3) UL (ref 1.5–7.7)
NEUTROPHILS # BLD AUTO: 3.3 X10(3) UL (ref 1.5–7.7)
NEUTROPHILS NFR BLD AUTO: 66.9 %
PLATELET # BLD AUTO: 103 10(3)UL (ref 150–450)
RBC # BLD AUTO: 4.85 X10(6)UL
WBC # BLD AUTO: 4.9 X10(3) UL (ref 4–11)

## 2022-07-01 PROCEDURE — 84165 PROTEIN E-PHORESIS SERUM: CPT

## 2022-07-01 PROCEDURE — 86334 IMMUNOFIX E-PHORESIS SERUM: CPT

## 2022-07-01 PROCEDURE — 83521 IG LIGHT CHAINS FREE EACH: CPT

## 2022-07-01 PROCEDURE — 36415 COLL VENOUS BLD VENIPUNCTURE: CPT

## 2022-07-01 PROCEDURE — 85025 COMPLETE CBC W/AUTO DIFF WBC: CPT

## 2022-07-06 LAB
ALBUMIN SERPL ELPH-MCNC: 4.15 G/DL (ref 3.75–5.21)
ALBUMIN/GLOB SERPL: 1.51 {RATIO} (ref 1–2)
ALPHA1 GLOB SERPL ELPH-MCNC: 0.29 G/DL (ref 0.19–0.46)
ALPHA2 GLOB SERPL ELPH-MCNC: 0.58 G/DL (ref 0.48–1.05)
B-GLOBULIN SERPL ELPH-MCNC: 0.7 G/DL (ref 0.68–1.23)
GAMMA GLOB SERPL ELPH-MCNC: 1.18 G/DL (ref 0.62–1.7)
KAPPA LC FREE SER-MCNC: 3.55 MG/DL (ref 0.33–1.94)
KAPPA LC FREE/LAMBDA FREE SER NEPH: 1.56 {RATIO} (ref 0.26–1.65)
LAMBDA LC FREE SERPL-MCNC: 2.28 MG/DL (ref 0.57–2.63)
PROT SERPL-MCNC: 6.9 G/DL (ref 6.4–8.2)

## 2022-07-07 ENCOUNTER — TELEPHONE (OUTPATIENT)
Dept: HEMATOLOGY/ONCOLOGY | Facility: HOSPITAL | Age: 82
End: 2022-07-07

## 2022-07-08 ENCOUNTER — OFFICE VISIT (OUTPATIENT)
Dept: HEMATOLOGY/ONCOLOGY | Facility: HOSPITAL | Age: 82
End: 2022-07-08
Attending: INTERNAL MEDICINE
Payer: MEDICARE

## 2022-07-08 VITALS
RESPIRATION RATE: 18 BRPM | TEMPERATURE: 97 F | SYSTOLIC BLOOD PRESSURE: 150 MMHG | HEART RATE: 80 BPM | OXYGEN SATURATION: 98 % | DIASTOLIC BLOOD PRESSURE: 79 MMHG | BODY MASS INDEX: 24 KG/M2 | WEIGHT: 182.13 LBS

## 2022-07-08 DIAGNOSIS — R76.8 ELEVATED SERUM IMMUNOGLOBULIN FREE LIGHT CHAINS: ICD-10-CM

## 2022-07-08 DIAGNOSIS — D69.3 CHRONIC ITP (IDIOPATHIC THROMBOCYTOPENIA) (HCC): Primary | ICD-10-CM

## 2022-07-08 PROCEDURE — 99214 OFFICE O/P EST MOD 30 MIN: CPT | Performed by: INTERNAL MEDICINE

## 2022-07-08 NOTE — PROGRESS NOTES
Outpatient Oncology Care Plan  Problem list:  knowledge deficit    Problems related to:    disease/disease progression    Interventions:  provided general teaching    Expected outcomes:  understands plan of care    Progress towards outcome:  making progress    Education Record    Learner:  Patient and Family Member  Barriers / Limitations:  None  Method:  Brief focused  Outcome:  Shows understanding  Comments:  Pt here for follow up. No new complaints.

## 2022-08-26 PROBLEM — I71.20 THORACIC AORTIC ANEURYSM WITHOUT RUPTURE (HCC): Status: ACTIVE | Noted: 2019-05-21

## 2022-08-26 PROBLEM — J90 PLEURAL EFFUSION: Status: ACTIVE | Noted: 2019-05-27

## 2022-08-26 PROBLEM — D47.2 MGUS (MONOCLONAL GAMMOPATHY OF UNKNOWN SIGNIFICANCE): Status: ACTIVE | Noted: 2021-01-12

## 2022-08-26 PROBLEM — I71.9 AORTIC ROOT ANEURYSM (HCC): Status: ACTIVE | Noted: 2019-05-20

## 2022-08-26 PROBLEM — I71.21 AORTIC ROOT ANEURYSM (HCC): Status: ACTIVE | Noted: 2019-05-20

## 2022-08-26 PROBLEM — I71.21 AORTIC ROOT ANEURYSM: Status: ACTIVE | Noted: 2019-05-20

## 2022-08-26 PROBLEM — I00 RHEUMATIC FEVER: Status: ACTIVE | Noted: 2022-08-26

## 2022-08-26 PROBLEM — Q25.43 AORTIC ROOT ANEURYSM (HCC): Status: ACTIVE | Noted: 2019-05-20

## 2022-08-26 PROBLEM — I71.2 THORACIC AORTIC ANEURYSM WITHOUT RUPTURE (HCC): Status: ACTIVE | Noted: 2019-05-21

## 2022-08-26 PROBLEM — I71.20 THORACIC AORTIC ANEURYSM WITHOUT RUPTURE: Status: ACTIVE | Noted: 2019-05-21

## 2022-08-29 ENCOUNTER — OFFICE VISIT (OUTPATIENT)
Dept: SURGERY | Facility: CLINIC | Age: 82
End: 2022-08-29
Payer: MEDICARE

## 2022-08-29 VITALS
RESPIRATION RATE: 16 BRPM | TEMPERATURE: 98 F | SYSTOLIC BLOOD PRESSURE: 146 MMHG | HEART RATE: 78 BPM | DIASTOLIC BLOOD PRESSURE: 95 MMHG | WEIGHT: 178 LBS | BODY MASS INDEX: 23 KG/M2 | OXYGEN SATURATION: 97 %

## 2022-08-29 DIAGNOSIS — C44.90 TRICHILEMMAL CARCINOMA: Primary | ICD-10-CM

## 2022-08-29 PROBLEM — R31.9 HEMATURIA: Status: RESOLVED | Noted: 2017-06-17 | Resolved: 2022-08-29

## 2022-08-29 PROCEDURE — 99205 OFFICE O/P NEW HI 60 MIN: CPT | Performed by: SURGERY

## 2022-08-29 NOTE — PATIENT INSTRUCTIONS
1. Your procedure is scheduled at 10:00 AM at [address] [place] [address], [address] [facility], [address] [place] [address]  2. You will require COVID testing prior to procedure and will need to arrive at 9:00 AM for testing  3. Please take 1000 mg of tylenol prior to procedure for pain control. 4. Someone will need to drive you the day of your procedure.

## 2022-09-06 ENCOUNTER — TELEPHONE (OUTPATIENT)
Dept: SURGERY | Facility: CLINIC | Age: 82
End: 2022-09-06

## 2022-09-06 DIAGNOSIS — C44.90 TRICHILEMMAL CARCINOMA: Primary | ICD-10-CM

## 2022-09-06 NOTE — TELEPHONE ENCOUNTER
Patient was called and LVM reminding him of his appointment and to come in early to have a covid test done prior to his procedure.

## 2022-09-07 ENCOUNTER — OFFICE VISIT (OUTPATIENT)
Dept: SURGERY | Facility: CLINIC | Age: 82
End: 2022-09-07
Payer: MEDICARE

## 2022-09-07 VITALS — OXYGEN SATURATION: 94 % | HEART RATE: 71 BPM | DIASTOLIC BLOOD PRESSURE: 86 MMHG | SYSTOLIC BLOOD PRESSURE: 125 MMHG

## 2022-09-07 DIAGNOSIS — D04.4 SQUAMOUS CELL CARCINOMA IN SITU OF SKIN OF NECK: ICD-10-CM

## 2022-09-07 DIAGNOSIS — C44.90 TRICHILEMMAL CARCINOMA: Primary | ICD-10-CM

## 2022-09-07 LAB — SARS-COV-2 RNA RESP QL NAA+PROBE: NOT DETECTED

## 2022-09-07 PROCEDURE — 13132 CMPLX RPR F/C/C/M/N/AX/G/H/F: CPT | Performed by: SURGERY

## 2022-09-07 PROCEDURE — 11622 EXC S/N/H/F/G MAL+MRG 1.1-2: CPT | Performed by: SURGERY

## 2022-09-07 PROCEDURE — 88305 TISSUE EXAM BY PATHOLOGIST: CPT | Performed by: SURGERY

## 2022-09-07 NOTE — PATIENT INSTRUCTIONS
1.Change gauze as needed if it becomes saturated. You may remove gauze and shower after 2 days or as directed. 2. Do not soak in water or go swimming until incision is healed. 3. After showering, you may leave incision uncovered and open to air or cover with gauze and tape. 4. You may take Tylenol or Advil for pain. 5. Call our office at [phone] if you have excessive bleeding, redness, drainage, pain or fever. 6. Schedule your follow up appointment as directed.

## 2022-09-07 NOTE — PROCEDURES
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 He has prior diagnosis of thoracic laminal carcinoma. Upon review by our pathologist, squamous cell carcinoma in situ was rendered. This was discussed with him. Patient was brought to the operating room and was placed in supine position. He was prepped and draped normal sterile fashion. 1% lidocaine was used as local anesthetic. An elliptical incision measuring  to 1.1 cm x 2.6 cm was made and deepened to underlying muscle/fascia. The specimen was excised oriented and sent to pathology. Hemostasis was achieved and maintained. To allow for primary closure without tension flaps were raised and undermined superiorly inferiorly and layered closure was attained using 3-0 Vicryl's and 4-0 Vicryl sutures. Steri-Strips with Telfa and Tegaderm dressings applied. Patient tolerated procedure well without immediate complications. Instructions and follow-up discussed. Jorge Moran MD FACS

## 2022-09-08 ENCOUNTER — OFFICE VISIT (OUTPATIENT)
Facility: LOCATION | Age: 82
End: 2022-09-08
Payer: MEDICARE

## 2022-09-08 DIAGNOSIS — R04.0 EPISTAXIS: Primary | ICD-10-CM

## 2022-09-08 PROCEDURE — 30901 CONTROL OF NOSEBLEED: CPT | Performed by: OTOLARYNGOLOGY

## 2022-09-08 PROCEDURE — 99203 OFFICE O/P NEW LOW 30 MIN: CPT | Performed by: OTOLARYNGOLOGY

## 2023-01-02 ENCOUNTER — APPOINTMENT (OUTPATIENT)
Dept: HEMATOLOGY/ONCOLOGY | Facility: HOSPITAL | Age: 83
End: 2023-01-02
Attending: INTERNAL MEDICINE
Payer: MEDICARE

## 2023-01-03 ENCOUNTER — NURSE ONLY (OUTPATIENT)
Dept: HEMATOLOGY/ONCOLOGY | Facility: HOSPITAL | Age: 83
End: 2023-01-03
Attending: INTERNAL MEDICINE
Payer: MEDICARE

## 2023-01-03 DIAGNOSIS — D69.6 THROMBOCYTOPENIA (HCC): ICD-10-CM

## 2023-01-03 DIAGNOSIS — D47.2 MGUS (MONOCLONAL GAMMOPATHY OF UNKNOWN SIGNIFICANCE): ICD-10-CM

## 2023-01-03 LAB
BASOPHILS # BLD AUTO: 0.03 X10(3) UL (ref 0–0.2)
BASOPHILS NFR BLD AUTO: 0.7 %
EOSINOPHIL # BLD AUTO: 0.18 X10(3) UL (ref 0–0.7)
EOSINOPHIL NFR BLD AUTO: 3.9 %
ERYTHROCYTE [DISTWIDTH] IN BLOOD BY AUTOMATED COUNT: 12.9 %
HCT VFR BLD AUTO: 48.7 %
HGB BLD-MCNC: 16 G/DL
IMM GRANULOCYTES # BLD AUTO: 0.02 X10(3) UL (ref 0–1)
IMM GRANULOCYTES NFR BLD: 0.4 %
LYMPHOCYTES # BLD AUTO: 0.73 X10(3) UL (ref 1–4)
LYMPHOCYTES NFR BLD AUTO: 16 %
MCH RBC QN AUTO: 31.2 PG (ref 26–34)
MCHC RBC AUTO-ENTMCNC: 32.9 G/DL (ref 31–37)
MCV RBC AUTO: 94.9 FL
MONOCYTES # BLD AUTO: 0.48 X10(3) UL (ref 0.1–1)
MONOCYTES NFR BLD AUTO: 10.5 %
NEUTROPHILS # BLD AUTO: 3.13 X10 (3) UL (ref 1.5–7.7)
NEUTROPHILS # BLD AUTO: 3.13 X10(3) UL (ref 1.5–7.7)
NEUTROPHILS NFR BLD AUTO: 68.5 %
PLATELET # BLD AUTO: 99 10(3)UL (ref 150–450)
RBC # BLD AUTO: 5.13 X10(6)UL
WBC # BLD AUTO: 4.6 X10(3) UL (ref 4–11)

## 2023-01-03 PROCEDURE — 85025 COMPLETE CBC W/AUTO DIFF WBC: CPT

## 2023-01-03 PROCEDURE — 84165 PROTEIN E-PHORESIS SERUM: CPT

## 2023-01-03 PROCEDURE — 36415 COLL VENOUS BLD VENIPUNCTURE: CPT

## 2023-01-03 PROCEDURE — 86334 IMMUNOFIX E-PHORESIS SERUM: CPT

## 2023-01-03 PROCEDURE — 83521 IG LIGHT CHAINS FREE EACH: CPT

## 2023-01-05 LAB
ALBUMIN SERPL ELPH-MCNC: 4.16 G/DL (ref 3.75–5.21)
ALBUMIN/GLOB SERPL: 1.46 {RATIO} (ref 1–2)
ALPHA1 GLOB SERPL ELPH-MCNC: 0.31 G/DL (ref 0.19–0.46)
ALPHA2 GLOB SERPL ELPH-MCNC: 0.6 G/DL (ref 0.48–1.05)
B-GLOBULIN SERPL ELPH-MCNC: 0.7 G/DL (ref 0.68–1.23)
GAMMA GLOB SERPL ELPH-MCNC: 1.23 G/DL (ref 0.62–1.7)
KAPPA LC FREE SER-MCNC: 3.94 MG/DL (ref 0.33–1.94)
KAPPA LC FREE/LAMBDA FREE SER NEPH: 1.54 {RATIO} (ref 0.26–1.65)
LAMBDA LC FREE SERPL-MCNC: 2.56 MG/DL (ref 0.57–2.63)
PROT SERPL-MCNC: 7 G/DL (ref 6.4–8.2)

## 2023-01-09 ENCOUNTER — OFFICE VISIT (OUTPATIENT)
Dept: HEMATOLOGY/ONCOLOGY | Facility: HOSPITAL | Age: 83
End: 2023-01-09
Attending: INTERNAL MEDICINE
Payer: MEDICARE

## 2023-01-09 VITALS
BODY MASS INDEX: 24 KG/M2 | RESPIRATION RATE: 16 BRPM | WEIGHT: 180.5 LBS | TEMPERATURE: 97 F | SYSTOLIC BLOOD PRESSURE: 148 MMHG | OXYGEN SATURATION: 100 % | DIASTOLIC BLOOD PRESSURE: 90 MMHG | HEART RATE: 80 BPM

## 2023-01-09 DIAGNOSIS — D69.3 CHRONIC ITP (IDIOPATHIC THROMBOCYTOPENIA) (HCC): ICD-10-CM

## 2023-01-09 DIAGNOSIS — D47.2 MGUS (MONOCLONAL GAMMOPATHY OF UNKNOWN SIGNIFICANCE): Primary | ICD-10-CM

## 2023-01-09 PROCEDURE — 99214 OFFICE O/P EST MOD 30 MIN: CPT | Performed by: INTERNAL MEDICINE

## 2023-10-13 ENCOUNTER — LAB ENCOUNTER (OUTPATIENT)
Dept: LAB | Facility: HOSPITAL | Age: 83
End: 2023-10-13
Attending: INTERNAL MEDICINE
Payer: MEDICARE

## 2023-10-13 DIAGNOSIS — I49.5 SICK SINUS SYNDROME (HCC): ICD-10-CM

## 2023-10-13 DIAGNOSIS — Z95.0 PACEMAKER: Primary | ICD-10-CM

## 2023-10-13 LAB
ANION GAP SERPL CALC-SCNC: 2 MMOL/L (ref 0–18)
BUN BLD-MCNC: 18 MG/DL (ref 7–18)
CALCIUM BLD-MCNC: 10 MG/DL (ref 8.5–10.1)
CHLORIDE SERPL-SCNC: 105 MMOL/L (ref 98–112)
CO2 SERPL-SCNC: 31 MMOL/L (ref 21–32)
CREAT BLD-MCNC: 1.04 MG/DL
EGFRCR SERPLBLD CKD-EPI 2021: 70 ML/MIN/1.73M2 (ref 60–?)
FASTING STATUS PATIENT QL REPORTED: NO
GLUCOSE BLD-MCNC: 121 MG/DL (ref 70–99)
MAGNESIUM SERPL-MCNC: 2 MG/DL (ref 1.6–2.6)
OSMOLALITY SERPL CALC.SUM OF ELEC: 289 MOSM/KG (ref 275–295)
POTASSIUM SERPL-SCNC: 5 MMOL/L (ref 3.5–5.1)
SODIUM SERPL-SCNC: 138 MMOL/L (ref 136–145)

## 2023-10-13 PROCEDURE — 83735 ASSAY OF MAGNESIUM: CPT

## 2023-10-13 PROCEDURE — 36415 COLL VENOUS BLD VENIPUNCTURE: CPT

## 2023-10-13 PROCEDURE — 80048 BASIC METABOLIC PNL TOTAL CA: CPT

## 2024-01-03 ENCOUNTER — NURSE ONLY (OUTPATIENT)
Dept: HEMATOLOGY/ONCOLOGY | Facility: HOSPITAL | Age: 84
End: 2024-01-03
Attending: INTERNAL MEDICINE
Payer: MEDICARE

## 2024-01-03 DIAGNOSIS — D47.2 MGUS (MONOCLONAL GAMMOPATHY OF UNKNOWN SIGNIFICANCE): ICD-10-CM

## 2024-01-03 DIAGNOSIS — D69.3 CHRONIC ITP (IDIOPATHIC THROMBOCYTOPENIA) (HCC): ICD-10-CM

## 2024-01-03 LAB
ALBUMIN SERPL-MCNC: 3.7 G/DL (ref 3.4–5)
ALBUMIN/GLOB SERPL: 1.1 {RATIO} (ref 1–2)
ALP LIVER SERPL-CCNC: 59 U/L
ALT SERPL-CCNC: 19 U/L
ANION GAP SERPL CALC-SCNC: 1 MMOL/L (ref 0–18)
AST SERPL-CCNC: 18 U/L (ref 15–37)
BASOPHILS # BLD AUTO: 0.03 X10(3) UL (ref 0–0.2)
BASOPHILS NFR BLD AUTO: 0.6 %
BILIRUB SERPL-MCNC: 0.8 MG/DL (ref 0.1–2)
BUN BLD-MCNC: 20 MG/DL (ref 9–23)
CALCIUM BLD-MCNC: 10 MG/DL (ref 8.5–10.1)
CHLORIDE SERPL-SCNC: 106 MMOL/L (ref 98–112)
CO2 SERPL-SCNC: 29 MMOL/L (ref 21–32)
CREAT BLD-MCNC: 1.05 MG/DL
EGFRCR SERPLBLD CKD-EPI 2021: 70 ML/MIN/1.73M2 (ref 60–?)
EOSINOPHIL # BLD AUTO: 0.16 X10(3) UL (ref 0–0.7)
EOSINOPHIL NFR BLD AUTO: 3.3 %
ERYTHROCYTE [DISTWIDTH] IN BLOOD BY AUTOMATED COUNT: 13 %
GLOBULIN PLAS-MCNC: 3.4 G/DL (ref 2.8–4.4)
GLUCOSE BLD-MCNC: 103 MG/DL (ref 70–99)
HCT VFR BLD AUTO: 49.6 %
HGB BLD-MCNC: 16.2 G/DL
IMM GRANULOCYTES # BLD AUTO: 0.02 X10(3) UL (ref 0–1)
IMM GRANULOCYTES NFR BLD: 0.4 %
LYMPHOCYTES # BLD AUTO: 0.84 X10(3) UL (ref 1–4)
LYMPHOCYTES NFR BLD AUTO: 17.5 %
MCH RBC QN AUTO: 30.7 PG (ref 26–34)
MCHC RBC AUTO-ENTMCNC: 32.7 G/DL (ref 31–37)
MCV RBC AUTO: 94.1 FL
MONOCYTES # BLD AUTO: 0.5 X10(3) UL (ref 0.1–1)
MONOCYTES NFR BLD AUTO: 10.4 %
NEUTROPHILS # BLD AUTO: 3.24 X10 (3) UL (ref 1.5–7.7)
NEUTROPHILS # BLD AUTO: 3.24 X10(3) UL (ref 1.5–7.7)
NEUTROPHILS NFR BLD AUTO: 67.8 %
OSMOLALITY SERPL CALC.SUM OF ELEC: 285 MOSM/KG (ref 275–295)
PLATELET # BLD AUTO: 95 10(3)UL (ref 150–450)
POTASSIUM SERPL-SCNC: 4.5 MMOL/L (ref 3.5–5.1)
PROT SERPL-MCNC: 7.1 G/DL (ref 6.4–8.2)
RBC # BLD AUTO: 5.27 X10(6)UL
SODIUM SERPL-SCNC: 136 MMOL/L (ref 136–145)
WBC # BLD AUTO: 4.8 X10(3) UL (ref 4–11)

## 2024-01-03 PROCEDURE — 86334 IMMUNOFIX E-PHORESIS SERUM: CPT

## 2024-01-03 PROCEDURE — 83521 IG LIGHT CHAINS FREE EACH: CPT

## 2024-01-03 PROCEDURE — 85025 COMPLETE CBC W/AUTO DIFF WBC: CPT

## 2024-01-03 PROCEDURE — 36415 COLL VENOUS BLD VENIPUNCTURE: CPT

## 2024-01-03 PROCEDURE — 84165 PROTEIN E-PHORESIS SERUM: CPT

## 2024-01-03 PROCEDURE — 80053 COMPREHEN METABOLIC PANEL: CPT

## 2024-01-05 LAB
ALBUMIN SERPL ELPH-MCNC: 4.15 G/DL (ref 3.75–5.21)
ALBUMIN/GLOB SERPL: 1.51 {RATIO} (ref 1–2)
ALPHA1 GLOB SERPL ELPH-MCNC: 0.29 G/DL (ref 0.19–0.46)
ALPHA2 GLOB SERPL ELPH-MCNC: 0.65 G/DL (ref 0.48–1.05)
B-GLOBULIN SERPL ELPH-MCNC: 0.66 G/DL (ref 0.68–1.23)
GAMMA GLOB SERPL ELPH-MCNC: 1.15 G/DL (ref 0.62–1.7)
KAPPA LC FREE SER-MCNC: 3.58 MG/DL (ref 0.33–1.94)
KAPPA LC FREE/LAMBDA FREE SER NEPH: 1.58 {RATIO} (ref 0.26–1.65)
LAMBDA LC FREE SERPL-MCNC: 2.27 MG/DL (ref 0.57–2.63)
PROT SERPL-MCNC: 6.9 G/DL (ref 5.7–8.2)

## 2024-01-08 ENCOUNTER — OFFICE VISIT (OUTPATIENT)
Dept: HEMATOLOGY/ONCOLOGY | Facility: HOSPITAL | Age: 84
End: 2024-01-08
Attending: INTERNAL MEDICINE
Payer: MEDICARE

## 2024-01-08 VITALS
HEART RATE: 79 BPM | WEIGHT: 177 LBS | TEMPERATURE: 98 F | OXYGEN SATURATION: 99 % | SYSTOLIC BLOOD PRESSURE: 144 MMHG | RESPIRATION RATE: 18 BRPM | DIASTOLIC BLOOD PRESSURE: 90 MMHG | BODY MASS INDEX: 23 KG/M2

## 2024-01-08 DIAGNOSIS — D69.3 CHRONIC ITP (IDIOPATHIC THROMBOCYTOPENIA) (HCC): ICD-10-CM

## 2024-01-08 DIAGNOSIS — D47.2 MGUS (MONOCLONAL GAMMOPATHY OF UNKNOWN SIGNIFICANCE): Primary | ICD-10-CM

## 2024-01-08 PROCEDURE — 99214 OFFICE O/P EST MOD 30 MIN: CPT | Performed by: INTERNAL MEDICINE

## 2024-01-08 NOTE — PROGRESS NOTES
Cancer Center Progress Note  Patient Name: Kwame Ospina   YOB: 1940   Medical Record Number: [id number]   [facility]: [id number]   Attending Physician: Srikanth Haro M.D.       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 He had previously followed with Ruddy Moore, but is transferring his care because [place] is closed to his home. He reports that he has had thrombocytopenia since 2008. Dr. Fox's notes report a normal one marrow biopsy, at that time, specifically indicating that the megakaryocytes were normal. His platelet count has fluctuated, since then, between 75 and 120k.   HE has also had mildly elevated Kappa and Lambda free LC levels, usually with a normal ratio. No monoclonal protein has been detected.  He reports several bleeding episodes in the past, associated with anticoagulation for an artificial valve. He underwent a valve replacement surgery at the [facility] replacing both the mitral and aortic valves with bioprosthetic valves. He has been taken off anticoagulation. 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     He has had no signs or symptoms of myeloma.    He has no bleeding or bruising, at this time      History of Present Illness:  Pt is here for follow up. No complaints.    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    Hypertension      Intermittent claudication (HCC)     Long term current use of anticoagulant     Mitral regurgitation     Pacemaker     Prostatitis, unspecified     Rheumatic fever     Sick sinus syndrome (HCC)     Status post aortic valve replacement        Past Surgical History:  Past Surgical History:   Procedure Laterality Date    ANGIOGRAM      EYE SURGERY       ARTERIOGRAPHY TRANSCATH TX EMBOLIZATION ANY METHOD RAD S AND I  2016    left superior gluteal artery aneurysm    OTHER      TURP    OTHER      OTHER  2012    Varicose vein laser ablation    OTHER SURGICAL HISTORY  2002    , aortic valve replacement    OTHER SURGICAL HISTORY  2002    Mitral valve repair    OTHER SURGICAL HISTORY      inguinal hernia repair    OTHER SURGICAL HISTORY      spermatocele    PACEMAKER      [facility] DDD    REPLACEMENT OF MITRAL VALVE      VALVE REPAIR      VALVE REPLACEMENT         Family History:  Family History   Problem Relation Age of Onset    Diabetes Mother     Heart Disorder Mother     Hypertension Father     Stroke Father     Prostate Cancer Brother         Treated    Diabetes Maternal Grandmother     Heart Disorder Maternal Grandmother        Social History:  Social History     Socioeconomic History    Marital status:      Spouse name: Not on file    Number of children: 1    Years of education: Not on file    Highest education level: Not on file   Occupational History    Occupation: Retired   Tobacco Use    Smoking status: Former     Packs/day: 1.00     Years: 20.00     Additional pack years: 0.00     Total pack years: 20.00     Types: Cigarettes     Quit date: 1978     Years since quittin.7    Smokeless tobacco: Never   Vaping Use    Vaping Use: Never used   Substance and Sexual Activity    Alcohol use: Yes     Comment: wine 1-2 drinks week    Drug use: No    Sexual activity: Not on file   Other Topics Concern     Service Not Asked    Blood Transfusions Not Asked    Caffeine Concern Not Asked     Occupational Exposure Not Asked    Hobby Hazards Not Asked    Sleep Concern Not Asked    Stress Concern Not Asked    Weight Concern Not Asked    Special Diet Not Asked    Back Care Not Asked    Exercise Yes    Bike Helmet Not Asked    Seat Belt Not Asked    Self-Exams Not Asked   Social History Narrative    Not on file     Social Determinants of Health     Financial Resource Strain: Not on file   Food Insecurity: Not on file   Transportation Needs: Not on file   Physical Activity: Not on file   Stress: Not on file   Social Connections: Not on file   Housing Stability: Not on file       Current Medications:    Current Outpatient Medications:     triamcinolone 0.1 % External Cream, Apply 1 Application topically 2 (two) times daily., Disp: 80 g, Rfl: 1    FINASTERIDE 5 MG Oral Tab, TAKE 1 TABLET BY MOUTH  DAILY, Disp: 90 tablet, Rfl: 3    DIGOXIN 0.125 MG Oral Tab, TAKE 1 TABLET BY MOUTH  DAILY, Disp: 90 tablet, Rfl: 3    METOPROLOL SUCCINATE 50 MG Oral Tablet 24 Hr, TAKE 1 TABLET BY MOUTH  TWICE DAILY, Disp: 180 tablet, Rfl: 1    losartan 25 MG Oral Tab, Take 1 tablet (25 mg total) by mouth 2 (two) times daily., Disp: 180 tablet, Rfl: 2    aspirin 81 MG Oral Chew Tab, Chew 81 mg by mouth daily., Disp: , Rfl:     Multiple Vitamins-Minerals (MULTI-VITAMIN/MINERALS) Oral Tab, Take 1 tablet by mouth daily., Disp: , Rfl:     acetaminophen 325 MG Oral Tab, Take 325 mg by mouth every 6 (six) hours as needed for Pain., Disp: , Rfl:     Allergies:  Allergies   Allergen Reactions    Carvedilol DIZZINESS, HYPOTENSION and OTHER (SEE COMMENTS)     Cardiac reaction          Review of Systems:    Constitutional No fevers, chills, night sweats, excessive fatigue or weight loss.   Eyes No significant visual difficulties. No diplopia. No yellowing of the eyes.   Hematologic/Lymphatic No easy bruising or bleeding.  No any tender or palpable lymph nodes.   Respiratory No dyspnea, Pleuritic chest pain, cough or hemoptysis.    Cardiovascular No anginal chest pain, palpitations or orthopnea.   Gastrointestinal No nausea, vomiting, diarrhea, GI bleeding, or constipation.   Genitorurinary (M) No hematuria, dysuria, or incontinence. No abnormal bleeding.   Integumentary No rashes or yellowing of the skin   Neurologic No headache, blurred vision, and no areas of focal weakness. Normal gait.   Psychiatric No insomnia, depression, katerina or mood swings.         Vital Signs:  /90 (BP Location: Left arm, Patient Position: Sitting, Cuff Size: adult)   Pulse 79   Temp 98.1 °F (36.7 °C) (Tympanic)   Resp 18   Wt 80.3 kg (177 lb)   SpO2 99%   BMI 23.35 kg/m²     Physical Examination:    Constitutional Normal - Mood and affect appropriate. Appears close to chronological age. Well nourished. Well developed.   Eyes Normal - Conjunctivae and sclerae are clear and without icterus. Pupils are reactive and equal.   Hematologic/Lymphatic Normal - No petechiae or purpura.  No tender or palpable lymph nodes in the cervical, supraclavicular, axillary or inguinal area.   Respiratory Normal - Lungs are clear to auscultation, no rhonchi or wheezing.   Cardiovascular Normal - Regular rate and rhythm, no murmurs, gallops or rubs.   Abdomen Normal - Non-tender, non-distended, no masses, ascites or hepatosplenomegaly.    Extremities Normal - No visible deformities, no cyanosis, clubbing or edema.    Integumentary Normal - No rashes, No Jaundice   Neurologic Normal - No sensory or motor deficits, normal cerebellar function, normal gait, cranial nerves intact.   Psychiatric Normal - Alert and oriented times three. Coherent speech. Verbalizes understanding of our discussions today.         Laboratory:   Latest Reference Range & Units 01/03/24 10:03   Sodium 136 - 145 mmol/L 136   Potassium 3.5 - 5.1 mmol/L 4.5   Chloride 98 - 112 mmol/L 106   Carbon Dioxide, Total 21.0 - 32.0 mmol/L 29.0   BUN 9 - 23 mg/dL 20   CREATININE 0.70 - 1.30 mg/dL 1.05   CALCIUM 8.5  - 10.1 mg/dL 10.0   EGFR >=60 mL/min/1.73m2 70   ANION GAP 0 - 18 mmol/L 1   CALCULATED OSMOLALITY 275 - 295 mOsm/kg 285   ALKALINE PHOSPHATASE 45 - 117 U/L 59   AST (SGOT) 15 - 37 U/L 18   ALT (SGPT) 16 - 61 U/L 19   Total Bilirubin 0.1 - 2.0 mg/dL 0.8   Globulin 2.8 - 4.4 g/dL 3.4      Latest Reference Range & Units 01/03/24 10:03   A/G Ratio 1.0 - 2.0  1.1   KAPPA FREE LIGHT CHAIN 0.330 - 1.940 mg/dL 3.579 (H)   LAMBDA FREE LIGHT CHAIN 0.571 - 2.630 mg/dL 2.267   KAPPA/LAMBDA FLC RATIO 0.26 - 1.65  1.58   PROTEIN, TOTAL 5.7 - 8.2 g/dL  6.4 - 8.2 g/dL 6.9  7.1   Albumin 3.75 - 5.21 g/dL  3.4 - 5.0 g/dL 4.15  3.7   ALPHA-1-GLOBULINS 0.19 - 0.46 g/dL 0.29   ALPHA-2-GLOBULINS 0.48 - 1.05 g/dL 0.65   BETA GLOBULINS 0.68 - 1.23 g/dL 0.66 (L)   GAMMA GLOBULINS 0.62 - 1.70 g/dL 1.15   ALBUMIN/GLOBULIN RATIO 1.00 - 2.00  1.51   SPE INTERPRETATION  No apparent monoclonal protein on serum electrophoresis.    Reviewed by Cathryn Goldberg, M.D. Pathology 01/05/24 at 5:25 PM     IMMUNOFIXATION  No monoclonal protein detected by immunofixation.    Reviewed by Cathryn Goldberg, M.D. Pathology 01/05/24 at 5:25 PM     (H): Data is abnormally high  (L): Data is abnormally low   Latest Reference Range & Units 01/03/24 10:03   WBC 4.0 - 11.0 x10(3) uL 4.8   Hemoglobin 13.0 - 17.5 g/dL 16.2   Hematocrit 39.0 - 53.0 % 49.6   Platelet Count 150.0 - 450.0 10(3)uL 95.0 (L)   RBC 3.80 - 5.80 x10(6)uL 5.27   MCH 26.0 - 34.0 pg 30.7   MCHC 31.0 - 37.0 g/dL 32.7   MCV 80.0 - 100.0 fL 94.1   RDW % 13.0   Prelim Neutrophil Abs 1.50 - 7.70 x10 (3) uL 3.24   Neutrophils Absolute 1.50 - 7.70 x10(3) uL 3.24   Lymphocytes Absolute 1.00 - 4.00 x10(3) uL 0.84 (L)   Monocytes Absolute 0.10 - 1.00 x10(3) uL 0.50   Eosinophils Absolute 0.00 - 0.70 x10(3) uL 0.16   Basophils Absolute 0.00 - 0.20 x10(3) uL 0.03   Immature Granulocyte Absolute 0.00 - 1.00 x10(3) uL 0.02   Neutrophils % % 67.8   Lymphocytes % % 17.5   Monocytes % % 10.4   Eosinophils % % 3.3    Basophils % % 0.6   Immature Granulocyte % % 0.4   (L): Data is abnormally low    Radiology:    Pathology:    Impression and Plan:  ITP: Platelet count is stable. No need for treatment.    Elevated free LC: No evidence of progression to myeloma. 12 month follow up.    Planned Follow Up:  12 months      Electronically Signed by:    Srikanth Haro M.D.  [facility]

## 2024-01-08 NOTE — PROGRESS NOTES
Pt here for follow up.  No new complaints.    Outpatient Oncology Care Plan  Problem list:  knowledge deficit    Problems related to:    disease/disease progression    Interventions:  provided general teaching    Expected outcomes:  understands plan of care    Progress towards outcome:  making progress    Education Record    Learner:  Patient  Barriers / Limitations:  None  Method:  Brief focused  Outcome:  Shows understanding  Comments:

## 2024-01-30 ENCOUNTER — OFFICE VISIT (OUTPATIENT)
Facility: LOCATION | Age: 84
End: 2024-01-30
Payer: MEDICARE

## 2024-01-30 DIAGNOSIS — H90.3 SENSORINEURAL HEARING LOSS (SNHL) OF BOTH EARS: ICD-10-CM

## 2024-01-30 DIAGNOSIS — H90.3 SENSORINEURAL HEARING LOSS, BILATERAL: Primary | ICD-10-CM

## 2024-01-30 DIAGNOSIS — R04.0 EPISTAXIS: Primary | ICD-10-CM

## 2024-01-30 PROCEDURE — 99213 OFFICE O/P EST LOW 20 MIN: CPT | Performed by: OTOLARYNGOLOGY

## 2024-01-30 PROCEDURE — 92567 TYMPANOMETRY: CPT | Performed by: AUDIOLOGIST

## 2024-01-30 PROCEDURE — 92557 COMPREHENSIVE HEARING TEST: CPT | Performed by: AUDIOLOGIST

## 2024-01-30 NOTE — PROGRESS NOTES
Kwame Ospina was seen for an audiometric evaluation and tympanogram today. Referred back to physician.    Leonarda Bright, AuD

## 2024-01-31 NOTE — PROGRESS NOTES
Kwame Ospina is a 83 year old male.   Chief Complaint   Patient presents with    Hearing Check     HPI:   He has a history epistaxis.  He has done well since cautery a year and a half ago.  He has noticed some decreased hearing.  He has had streptomycin a few times in the past.    REVIEW OF SYSTEMS:   GENERAL HEALTH: feels well otherwise  GENERAL : denies fever, chills, sweats, weight loss, weight gain  SKIN: denies any unusual skin lesions or rashes  RESPIRATORY: denies shortness of breath with exertion  NEURO: denies headaches    EXAM:   There were no vitals taken for this visit.    System Findings Details   Constitutional  Overall appearance - Normal.   Psychiatric  Orientation - Oriented to time, place, person & situation. Appropriate mood and affect.   Head/Face  Facial features -- Normal. Skull - Normal.   Eyes  Pupils equal ,round ,react to light and accomidate   Ears, Nose, Throat, Neck  Ears clear no fluid or wax nose mild crusting oropharynx clear neck no masses   Neurological  Memory - Normal. Cranial nerves - Cranial nerves II through XII grossly intact.   Lymph Detail  Submental. Submandibular. Anterior cervical. Posterior cervical. Supraclavicular.     Latest Audiogram Result (Hz) Exam performed: 1/30/2024 2:34 PM Last edited by Leonarda Bright Au.D on 1/30/2024 2:59 PM        125 250  1500 2000 3000 4000 6000 8000    Right air:  15 15  5  15 25 60  75    Left air:  25 20  10  15 30 55  70    Right mastoid bone:   5            Left mastoid bone:     0  10  50      Left mastoid bone (masked):   15            Masking right (mastoid bone):   5               Reliability:  Good    Transducer:  Inserts    Technique:  Conventional Audiometry    Comments:            Latest Speech Audiometry  Last edited by Leonarda Bright Au.D on 1/30/2024 2:57 PM       Ear Method PTA SAT SRT [facility] Test/list Score (%) Intensity Mask/noise Notes    right    10    96 75 45     left    5    100 75 45                    Latest Tympanogram Result       Probe Tone (Hz): Unknown Exam performed: 1/30/2024 2:34 PM Last edited by Leonarda Bright Au.D on 1/30/2024 2:59 PM      Tympanograms  These were drawn by a user, not generated from device data      Right Ear Left Ear                     Right Ear Left Ear    Tympanogram type: Type A Type A    Canal volume (mL): 0.76 0.72    Peak pressure (daPa): 11 -67    Peak amplitude (mL): 1.21 0.98    Tympanogram width (daPa):        Comments:                    Latest Audiogram and Tympanogram Result Text  Last edited by Leonarda Bright Au.D on 1/30/2024  2:59 PM      Study Result                 Narrative & Impression  Patient complained of difficulty understanding conversation at times. He denied tinnitus & vertigo.    Audiogram: WNL sloping to a mild to moderately-severe high frequency sensorineural hearing loss 250-8000 Hz, bilaterally.    Word Recognition Score in Quiet: Excellent for each ear, respectively.    Tympanometry: WNL, bilaterally.    Recommend: Follow up with ENT.    Priyanka Warner                   ASSESSMENT AND PLAN:   1. Epistaxis  Essentially resolved after cautery a year and a half ago    2. Sensorineural hearing loss (SNHL) of both ears  He has high-frequency hearing loss bilaterally.  He is medically cleared for hearing aids if he so desires.      The patient indicates understanding of these issues and agrees to the plan.    No follow-ups on file.    Floyd Cruz MD  1/30/2024  6:04 PM

## 2024-04-12 ENCOUNTER — LAB ENCOUNTER (OUTPATIENT)
Dept: LAB | Facility: HOSPITAL | Age: 84
End: 2024-04-12
Attending: INTERNAL MEDICINE
Payer: MEDICARE

## 2024-04-12 DIAGNOSIS — Z95.0 PACEMAKER: Primary | ICD-10-CM

## 2024-04-12 DIAGNOSIS — I49.5 SICK SINUS SYNDROME (HCC): ICD-10-CM

## 2024-04-12 LAB
ANION GAP SERPL CALC-SCNC: 1 MMOL/L (ref 0–18)
BUN BLD-MCNC: 22 MG/DL (ref 9–23)
CALCIUM BLD-MCNC: 10.4 MG/DL (ref 8.5–10.1)
CHLORIDE SERPL-SCNC: 105 MMOL/L (ref 98–112)
CO2 SERPL-SCNC: 32 MMOL/L (ref 21–32)
CREAT BLD-MCNC: 0.94 MG/DL
EGFRCR SERPLBLD CKD-EPI 2021: 79 ML/MIN/1.73M2 (ref 60–?)
FASTING STATUS PATIENT QL REPORTED: NO
GLUCOSE BLD-MCNC: 78 MG/DL (ref 70–99)
MAGNESIUM SERPL-MCNC: 2.1 MG/DL (ref 1.6–2.6)
OSMOLALITY SERPL CALC.SUM OF ELEC: 288 MOSM/KG (ref 275–295)
POTASSIUM SERPL-SCNC: 5.2 MMOL/L (ref 3.5–5.1)
SODIUM SERPL-SCNC: 138 MMOL/L (ref 136–145)

## 2024-04-12 PROCEDURE — 83735 ASSAY OF MAGNESIUM: CPT

## 2024-04-12 PROCEDURE — 36415 COLL VENOUS BLD VENIPUNCTURE: CPT

## 2024-04-12 PROCEDURE — 80048 BASIC METABOLIC PNL TOTAL CA: CPT

## 2025-01-06 ENCOUNTER — NURSE ONLY (OUTPATIENT)
Age: 85
End: 2025-01-06
Attending: INTERNAL MEDICINE
Payer: MEDICARE

## 2025-01-06 DIAGNOSIS — D69.3 CHRONIC ITP (IDIOPATHIC THROMBOCYTOPENIA) (HCC): ICD-10-CM

## 2025-01-06 DIAGNOSIS — D47.2 MGUS (MONOCLONAL GAMMOPATHY OF UNKNOWN SIGNIFICANCE): ICD-10-CM

## 2025-01-06 LAB
ALBUMIN SERPL-MCNC: 4.3 G/DL (ref 3.2–4.8)
ALBUMIN/GLOB SERPL: 1.4 {RATIO} (ref 1–2)
ALP LIVER SERPL-CCNC: 61 U/L
ALT SERPL-CCNC: 20 U/L
ANION GAP SERPL CALC-SCNC: 3 MMOL/L (ref 0–18)
AST SERPL-CCNC: 23 U/L (ref ?–34)
BASOPHILS # BLD AUTO: 0.04 X10(3) UL (ref 0–0.2)
BASOPHILS NFR BLD AUTO: 0.8 %
BILIRUB SERPL-MCNC: 0.9 MG/DL (ref 0.2–1.1)
BUN BLD-MCNC: 18 MG/DL (ref 9–23)
CALCIUM BLD-MCNC: 10.1 MG/DL (ref 8.7–10.4)
CHLORIDE SERPL-SCNC: 105 MMOL/L (ref 98–112)
CO2 SERPL-SCNC: 30 MMOL/L (ref 21–32)
CREAT BLD-MCNC: 0.91 MG/DL
EGFRCR SERPLBLD CKD-EPI 2021: 82 ML/MIN/1.73M2 (ref 60–?)
EOSINOPHIL # BLD AUTO: 0.16 X10(3) UL (ref 0–0.7)
EOSINOPHIL NFR BLD AUTO: 3.3 %
ERYTHROCYTE [DISTWIDTH] IN BLOOD BY AUTOMATED COUNT: 13.1 %
GLOBULIN PLAS-MCNC: 3 G/DL (ref 2–3.5)
GLUCOSE BLD-MCNC: 121 MG/DL (ref 70–99)
HCT VFR BLD AUTO: 47.9 %
HGB BLD-MCNC: 15.8 G/DL
IMM GRANULOCYTES # BLD AUTO: 0.02 X10(3) UL (ref 0–1)
IMM GRANULOCYTES NFR BLD: 0.4 %
LYMPHOCYTES # BLD AUTO: 0.75 X10(3) UL (ref 1–4)
LYMPHOCYTES NFR BLD AUTO: 15.7 %
MCH RBC QN AUTO: 31 PG (ref 26–34)
MCHC RBC AUTO-ENTMCNC: 33 G/DL (ref 31–37)
MCV RBC AUTO: 94.1 FL
MONOCYTES # BLD AUTO: 0.48 X10(3) UL (ref 0.1–1)
MONOCYTES NFR BLD AUTO: 10 %
NEUTROPHILS # BLD AUTO: 3.34 X10 (3) UL (ref 1.5–7.7)
NEUTROPHILS # BLD AUTO: 3.34 X10(3) UL (ref 1.5–7.7)
NEUTROPHILS NFR BLD AUTO: 69.8 %
OSMOLALITY SERPL CALC.SUM OF ELEC: 289 MOSM/KG (ref 275–295)
PLATELET # BLD AUTO: 94 10(3)UL (ref 150–450)
PLATELETS.RETICULATED NFR BLD AUTO: 4.3 % (ref 0–7)
POTASSIUM SERPL-SCNC: 5.2 MMOL/L (ref 3.5–5.1)
PROT SERPL-MCNC: 7.3 G/DL (ref 5.7–8.2)
RBC # BLD AUTO: 5.09 X10(6)UL
SODIUM SERPL-SCNC: 138 MMOL/L (ref 136–145)
WBC # BLD AUTO: 4.8 X10(3) UL (ref 4–11)

## 2025-01-07 LAB
ALBUMIN SERPL ELPH-MCNC: 4.07 G/DL (ref 3.75–5.21)
ALBUMIN/GLOB SERPL: 1.49 {RATIO} (ref 1–2)
ALPHA1 GLOB SERPL ELPH-MCNC: 0.31 G/DL (ref 0.19–0.46)
ALPHA2 GLOB SERPL ELPH-MCNC: 0.62 G/DL (ref 0.48–1.05)
B-GLOBULIN SERPL ELPH-MCNC: 0.69 G/DL (ref 0.68–1.23)
GAMMA GLOB SERPL ELPH-MCNC: 1.12 G/DL (ref 0.62–1.7)
KAPPA LC FREE SER-MCNC: 3.45 MG/DL (ref 0.33–1.94)
KAPPA LC FREE/LAMBDA FREE SER NEPH: 1.41 {RATIO} (ref 0.26–1.65)
LAMBDA LC FREE SERPL-MCNC: 2.46 MG/DL (ref 0.57–2.63)
PROT SERPL-MCNC: 6.8 G/DL (ref 5.7–8.2)

## 2025-01-13 ENCOUNTER — OFFICE VISIT (OUTPATIENT)
Age: 85
End: 2025-01-13
Attending: INTERNAL MEDICINE
Payer: MEDICARE

## 2025-01-13 VITALS
BODY MASS INDEX: 23.33 KG/M2 | OXYGEN SATURATION: 97 % | HEIGHT: 72.99 IN | WEIGHT: 176 LBS | DIASTOLIC BLOOD PRESSURE: 104 MMHG | SYSTOLIC BLOOD PRESSURE: 154 MMHG | TEMPERATURE: 97 F | HEART RATE: 82 BPM | RESPIRATION RATE: 16 BRPM

## 2025-01-13 DIAGNOSIS — D47.2 MGUS (MONOCLONAL GAMMOPATHY OF UNKNOWN SIGNIFICANCE): Primary | ICD-10-CM

## 2025-01-13 DIAGNOSIS — D69.3 CHRONIC ITP (IDIOPATHIC THROMBOCYTOPENIA) (HCC): ICD-10-CM

## 2025-01-13 NOTE — PROGRESS NOTES
[facility]cer Center Progress Note  Patient Name: Kwame Ospina   YOB: 1940   Medical Record Number: [id number]   CSN: [id number]   Attending Physician: Srikanth Haro M.D.       Date of Visit: 1/13/2025         Chief Complaint:  No chief complaint on file.       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 He had previously followed with Ruddy Moore, but is transferring his care because [facility] is closed to his home. He reports that he has had thrombocytopenia since 2008. Dr. Fox's notes report a normal one marrow biopsy, at that time, specifically indicating that the megakaryocytes were normal. His platelet count has fluctuated, since then, between 75 and 120k.   HE has also had mildly elevated Kappa and Lambda free LC levels, usually with a normal ratio. No monoclonal protein has been detected.  He reports several bleeding episodes in the past, associated with anticoagulation for an artificial valve. He underwent a valve replacement surgery at the [facility] replacing both the mitral and aortic valves with bioprosthetic valves. He has been taken off anticoagulation. 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     He has had no signs or symptoms of myeloma.    He has no bleeding or bruising, at this time      History of Present Illness:  Pt is here for follow up. No new complaints.    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    Hypertension    Intermittent claudication (HCC)    Long term current use of  anticoagulant    Mitral regurgitation    Pacemaker    Prostatitis, unspecified    Rheumatic fever    Sick sinus syndrome (HCC)    Status post aortic valve replacement       Past Surgical History:  Past Surgical History:   Procedure Laterality Date    Angiogram      Eye surgery      Hc arteriography transcath tx embolization any method rad s and i  2016    left superior gluteal artery aneurysm    Other      TURP    Other  2005    Other  2012    Varicose vein laser ablation    Other surgical history  2002    , aortic valve replacement    Other surgical history  2002    Mitral valve repair    Other surgical history      inguinal hernia repair    Other surgical history      spermatocele    Pacemaker  2002    [facility] DDD    Replacement of mitral valve      Valve repair      Valve replacement         Family History:  Family History   Problem Relation Age of Onset    Diabetes Mother     Heart Disorder Mother     Hypertension Father     Stroke Father     Prostate Cancer Brother         Treated    Diabetes Maternal Grandmother     Heart Disorder Maternal Grandmother        Social History:  Social History     Socioeconomic History    Marital status:      Spouse name: Not on file    Number of children: 1    Years of education: Not on file    Highest education level: Not on file   Occupational History    Occupation: Retired   Tobacco Use    Smoking status: Former     Current packs/day: 0.00     Average packs/day: 1 pack/day for 20.0 years (20.0 ttl pk-yrs)     Types: Cigarettes     Start date: 1958     Quit date: 1978     Years since quittin.7    Smokeless tobacco: Never   Vaping Use    Vaping status: Never Used   Substance and Sexual Activity    Alcohol use: Yes     Comment: wine 1-2 drinks week    Drug use: No    Sexual activity: Not on file   Other Topics Concern     Service Not Asked    Blood Transfusions Not Asked    Caffeine Concern Not Asked    Occupational Exposure Not Asked    Hobby  Hazards Not Asked    Sleep Concern Not Asked    Stress Concern Not Asked    Weight Concern Not Asked    Special Diet Not Asked    Back Care Not Asked    Exercise Yes    Bike Helmet Not Asked    Seat Belt Not Asked    Self-Exams Not Asked   Social History Narrative    Not on file     Social Drivers of Health     Financial Resource Strain: Not on file   Food Insecurity: Not on file   Transportation Needs: Not on file   Physical Activity: Not on file   Stress: Not on file   Social Connections: Not on file   Housing Stability: Not on file       Current Medications:    Current Outpatient Medications:     triamcinolone 0.1 % External Cream, Apply 1 Application topically 2 (two) times daily., Disp: 80 g, Rfl: 1    FINASTERIDE 5 MG Oral Tab, TAKE 1 TABLET BY MOUTH  DAILY, Disp: 90 tablet, Rfl: 3    DIGOXIN 0.125 MG Oral Tab, TAKE 1 TABLET BY MOUTH  DAILY, Disp: 90 tablet, Rfl: 3    METOPROLOL SUCCINATE 50 MG Oral Tablet 24 Hr, TAKE 1 TABLET BY MOUTH  TWICE DAILY, Disp: 180 tablet, Rfl: 1    losartan 25 MG Oral Tab, Take 1 tablet (25 mg total) by mouth 2 (two) times daily., Disp: 180 tablet, Rfl: 2    aspirin 81 MG Oral Chew Tab, Chew 1 tablet (81 mg total) by mouth daily., Disp: , Rfl:     Multiple Vitamins-Minerals (MULTI-VITAMIN/MINERALS) Oral Tab, Take 1 tablet by mouth daily., Disp: , Rfl:     acetaminophen 325 MG Oral Tab, Take 1 tablet (325 mg total) by mouth every 6 (six) hours as needed for Pain., Disp: , Rfl:     Allergies:  Allergies   Allergen Reactions    Carvedilol DIZZINESS, HYPOTENSION and OTHER (SEE COMMENTS)     Cardiac reaction          Review of Systems:    Constitutional No fevers, chills, night sweats, excessive fatigue or weight loss.   Eyes No significant visual difficulties. No diplopia. No yellowing of the eyes.   Hematologic/Lymphatic No easy bruising or bleeding.  No any tender or palpable lymph nodes.   Respiratory No dyspnea, Pleuritic chest pain, cough or hemoptysis.   Cardiovascular No anginal  chest pain, palpitations or orthopnea.   Gastrointestinal No nausea, vomiting, diarrhea, GI bleeding, or constipation.   Genitorurinary (M) No hematuria, dysuria, or incontinence. No abnormal bleeding.   Integumentary No rashes or yellowing of the skin   Neurologic No headache, blurred vision, and no areas of focal weakness. Normal gait.   Psychiatric No insomnia, depression, katerina or mood swings.         Vital Signs:  BP (!) 154/104 (BP Location: Left arm, Patient Position: Sitting, Cuff Size: adult)   Pulse 82   Temp 97.3 °F (36.3 °C) (Temporal)   Resp 16   Ht 1.854 m (6' 0.99\")   Wt 79.8 kg (176 lb)   SpO2 97%   BMI 23.23 kg/m²     Physical Examination:    Constitutional Normal - Mood and affect appropriate. Appears close to chronological age. Well nourished. Well developed.   Eyes Normal - Conjunctivae and sclerae are clear and without icterus. Pupils are reactive and equal.   Hematologic/Lymphatic Normal - No petechiae or purpura.  No tender or palpable lymph nodes in the cervical, supraclavicular, axillary or inguinal area.   Respiratory Normal - Lungs are clear to auscultation, no rhonchi or wheezing.   Cardiovascular Normal - Regular rate and rhythm, no murmurs, gallops or rubs.   Abdomen Normal - Non-tender, non-distended, no masses, ascites or hepatosplenomegaly.    Extremities Normal - No visible deformities, no cyanosis, clubbing or edema.    Integumentary Normal - No rashes, No Jaundice   Neurologic Normal - No sensory or motor deficits, normal cerebellar function, normal gait, cranial nerves intact.   Psychiatric Normal - Alert and oriented times three. Coherent speech. Verbalizes understanding of our discussions today.         Laboratory:   Latest Reference Range & Units 01/06/25 09:35   Sodium 136 - 145 mmol/L 138   Potassium 3.5 - 5.1 mmol/L 5.2 (H)   Chloride 98 - 112 mmol/L 105   Carbon Dioxide, Total 21.0 - 32.0 mmol/L 30.0   BUN 9 - 23 mg/dL 18   CREATININE 0.70 - 1.30 mg/dL 0.91    CALCIUM 8.7 - 10.4 mg/dL 10.1   EGFR >=60 mL/min/1.73m2 82   ANION GAP 0 - 18 mmol/L 3   CALCULATED OSMOLALITY 275 - 295 mOsm/kg 289   ALKALINE PHOSPHATASE 45 - 117 U/L 61   AST (SGOT) <34 U/L 23   ALT (SGPT) 10 - 49 U/L 20   Total Bilirubin 0.2 - 1.1 mg/dL 0.9   Globulin 2.0 - 3.5 g/dL 3.0   (H): Data is abnormally high     Latest Reference Range & Units 01/06/25 09:35   A/G Ratio 1.0 - 2.0  1.4   KAPPA FREE LIGHT CHAIN 0.330 - 1.940 mg/dL 3.452 (H)   LAMBDA FREE LIGHT CHAIN 0.571 - 2.630 mg/dL 2.456   KAPPA/LAMBDA FLC RATIO 0.26 - 1.65  1.41   PROTEIN, TOTAL 5.7 - 8.2 g/dL  5.7 - 8.2 g/dL 6.8  7.3   Albumin 3.75 - 5.21 g/dL  3.2 - 4.8 g/dL 4.07  4.3   ALPHA-1-GLOBULINS 0.19 - 0.46 g/dL 0.31   ALPHA-2-GLOBULINS 0.48 - 1.05 g/dL 0.62   BETA GLOBULINS 0.68 - 1.23 g/dL 0.69   GAMMA GLOBULINS 0.62 - 1.70 g/dL 1.12   ALBUMIN/GLOBULIN RATIO 1.00 - 2.00  1.49   SPE INTERPRETATION  No apparent monoclonal protein on serum electrophoresis.       IMMUNOFIXATION  No monoclonal protein detected by immunofixation.     (H): Data is abnormally high     Latest Reference Range & Units 01/06/25 09:35   WBC 4.0 - 11.0 x10(3) uL 4.8   Hemoglobin 13.0 - 17.5 g/dL 15.8   Hematocrit 39.0 - 53.0 % 47.9   Platelet Count 150.0 - 450.0 10(3)uL 94.0 (L)   RBC 3.80 - 5.80 x10(6)uL 5.09   MCH 26.0 - 34.0 pg 31.0   MCHC 31.0 - 37.0 g/dL 33.0   MCV 80.0 - 100.0 fL 94.1   RDW % 13.1   Immature Platelet Fraction 0.0 - 7.0 % 4.3   Prelim Neutrophil Abs 1.50 - 7.70 x10 (3) uL 3.34   Neutrophils Absolute 1.50 - 7.70 x10(3) uL 3.34   Lymphocytes Absolute 1.00 - 4.00 x10(3) uL 0.75 (L)   Monocytes Absolute 0.10 - 1.00 x10(3) uL 0.48   Eosinophils Absolute 0.00 - 0.70 x10(3) uL 0.16   Basophils Absolute 0.00 - 0.20 x10(3) uL 0.04   Immature Granulocyte Absolute 0.00 - 1.00 x10(3) uL 0.02   Neutrophils % % 69.8   Lymphocytes % % 15.7   Monocytes % % 10.0   Eosinophils % % 3.3   Basophils % % 0.8   Immature Granulocyte % % 0.4   (L): Data is abnormally  low  Radiology:    Pathology:    Impression and Plan:  ITP: Platelet count is stable. No need for treatment.    Elevated free LC: No evidence of progression to myeloma. 12 month follow up.    Hypertension: Pt advised to follow up with primary care.     Planned Follow Up:  12 months      Electronically Signed by:    FAUST[facility]

## 2025-01-13 NOTE — PROGRESS NOTES
Pt here for yearly follow up.  No new complaints.    Outpatient Oncology Care Plan  Problem list:  knowledge deficit    Problems related to:    disease/disease progression    Interventions:  provided general teaching    Expected outcomes:  understands plan of care    Progress towards outcome:  making progress    Education Record    Learner:  Patient  Barriers / Limitations:  None  Method:  Brief focused  Outcome:  Shows understanding  Comments:

## 2025-06-10 ENCOUNTER — LAB ENCOUNTER (OUTPATIENT)
Dept: LAB | Facility: HOSPITAL | Age: 85
End: 2025-06-10
Attending: INTERNAL MEDICINE
Payer: MEDICARE

## 2025-06-10 DIAGNOSIS — E87.5 HYPERKALEMIA: ICD-10-CM

## 2025-06-10 DIAGNOSIS — R73.01 IMPAIRED FASTING GLUCOSE: ICD-10-CM

## 2025-06-10 LAB
ALBUMIN SERPL-MCNC: 4.7 G/DL (ref 3.2–4.8)
ANION GAP SERPL CALC-SCNC: 4 MMOL/L (ref 0–18)
BUN BLD-MCNC: 20 MG/DL (ref 9–23)
CALCIUM BLD-MCNC: 10.5 MG/DL (ref 8.7–10.6)
CHLORIDE SERPL-SCNC: 103 MMOL/L (ref 98–112)
CO2 SERPL-SCNC: 33 MMOL/L (ref 21–32)
CREAT BLD-MCNC: 1.04 MG/DL (ref 0.7–1.3)
EGFRCR SERPLBLD CKD-EPI 2021: 69 ML/MIN/1.73M2 (ref 60–?)
EST. AVERAGE GLUCOSE BLD GHB EST-MCNC: 111 MG/DL (ref 68–126)
GLUCOSE BLD-MCNC: 104 MG/DL (ref 70–99)
HBA1C MFR BLD: 5.5 % (ref ?–5.7)
OSMOLALITY SERPL CALC.SUM OF ELEC: 293 MOSM/KG (ref 275–295)
PHOSPHATE SERPL-MCNC: 2.6 MG/DL (ref 2.4–5.1)
POTASSIUM SERPL-SCNC: 5.1 MMOL/L (ref 3.5–5.1)
SODIUM SERPL-SCNC: 140 MMOL/L (ref 136–145)

## 2025-06-10 PROCEDURE — 83036 HEMOGLOBIN GLYCOSYLATED A1C: CPT

## 2025-06-10 PROCEDURE — 80069 RENAL FUNCTION PANEL: CPT

## 2025-06-10 PROCEDURE — 36415 COLL VENOUS BLD VENIPUNCTURE: CPT

## 2025-06-11 ENCOUNTER — ORDER TRANSCRIPTION (OUTPATIENT)
Dept: PHYSICAL THERAPY | Facility: HOSPITAL | Age: 85
End: 2025-06-11

## 2025-06-11 DIAGNOSIS — R26.89 POOR BALANCE: Primary | ICD-10-CM

## 2025-06-17 ENCOUNTER — OFFICE VISIT (OUTPATIENT)
Dept: PHYSICAL THERAPY | Age: 85
End: 2025-06-17
Attending: INTERNAL MEDICINE
Payer: MEDICARE

## 2025-06-17 DIAGNOSIS — R26.89 POOR BALANCE: Primary | ICD-10-CM

## 2025-06-17 PROCEDURE — 97161 PT EVAL LOW COMPLEX 20 MIN: CPT

## 2025-06-17 PROCEDURE — 97112 NEUROMUSCULAR REEDUCATION: CPT

## 2025-06-17 NOTE — PROGRESS NOTES
LOWER EXTREMITY EVALUATION:     Diagnosis:   Poor balance (R26.89) Patient:  Kwame Ospina (85 year old, male)        Referring Provider: Jake Bailey  Today's Date   6/17/2025    Precautions:  Pacemaker   Date of Evaluation: 06/17/25  Next MD visit: No data recorded  Date of Surgery: No data recorded     PATIENT SUMMARY     Summary of chief complaints: reduced balance with numbness in left foot.  History of current condition: Pt states he had aneursym in his left leg in 2016 which ruptured. This resulted in left foot drop which has improved. Since 2016 his circulation has gotten worse and has been stumbling more. He states he wants to swing a golf club and is here to make his wife happy.   Pain level: current 0 /10, at best 0 /10, at worst 0 /10  Current limitations: initation of gait following sit to stand, fall risk, reduced balance  Past medical history was reviewed with Kwame.  Significant findings include: pacemaker, left foot drop.    Kwame  has a past medical history of Anemia, Aortic regurgitation, Arrhythmia, Atrial flutter (HCC), Chronic atrial fibrillation (HCC), Congestive heart disease (HCC), Endocarditis, Essential hypertension, H/O aortic valve replacement, Heart murmur, High blood pressure, History of ruptured arterial aneurysm (3/6/16), Hypertension, Intermittent claudication, Long term current use of anticoagulant, Mitral regurgitation, Pacemaker, Prostatitis, unspecified, Rheumatic fever, Sick sinus syndrome (HCC), and Status post aortic valve replacement.  He  has a past surgical history that includes other; other (2005); Eye surgery (2002); other (7/2012); replacement of mitral valve; pacemaker (2002); hc arteriography transcath tx embolization any method rad s and i (2016); angiogram; valve repair; valve replacement; other surgical history (2002); other surgical history (2002); other surgical history; and other surgical history.    ASSESSMENT  Kwame presents to physical therapy  evaluation with primary c/o reduced balance with numbness in left foot.. The results of the objective tests and measures show reduced balance, reduced strength. Functional deficits include but are not limited to initation of gait following sit to stand, fall risk, reduced balance. Signs and symptoms are consistent with diagnosis of Poor balance (R26.89). Pt and PT discussed evaluation findings, pathology, POC and HEP.  Pt voiced understanding and performs HEP correctly without reported pain. Skilled Physical Therapy is medically necessary to address the above impairments and reach functional goals.    OBJECTIVE:     HR 66, SPO2 96%, /91     Postural Control:  4-Stage Balance Test:   - Feet together: 30 sec   - Modified Tandem:  30 sec 30 on airex and 25 on flat surface with eyes closed    - Tandem Stance: 25 with significant compensations and 11s with R foot in front Fall Risk: no   - SLS: R 4 sec, L 2 sec Fall Risk: yes  [Full tandem stance <10 sec indicates increased risk of falling]  Age appropriate norms for SLS: 60-70 y/o mean = 27.0 sec      70-80 y/o mean = 17.2 sec      80-98 y/o mean = 8.5 sec     TUG (AD, time): 7 sec    Fall Risk: no  [Performance exceeding the upper limit of confidence intervals are considered increased risk for falls; Charanjit, 2006...   60-70 y/o mean 8.1s (7.1-9.0s)   70-80 y/o mean 9.2s (8.2-10.2s)   80-98 y/o mean 11.3s (10.0-12.7s)]    4 Item Dynamic Gait Index Score: 10/12 Fall Risk: yes  [Scores of 10 or less indicate increased risk for falls]  Gait level surface: 3  Grading: Gabriel the lowest category that applies.  (3)  Normal: Walks 20’, no assistive devices, good speed, no evidence of imbalance, normal gait pattern.  (2)  Mild Impairment: Walks 20’, uses assistive devices, slower speed, mild gait deviations.  (1)  Moderate Impairment: Walks 20’, slow speed, abnormal gait pattern, evidence of imbalance.  (0)  Severe Impairment: Cannot walk 20’ without assistance, severe  gait deviations or imbalance.     Change in gait speed: 3  Grading: Gabriel the lowest category that applies.  (3)  Normal: Able to smoothly change walking speed without loss of balance or gait deviation. Shows a significant difference in walking speed between normal, fast and slow speeds.   (2)  Mild Impairment: Is able to change speed but demonstrates mild gait deviations, or no gait deviations but unable to achieve a significant change in velocity, or uses an assistive device.   (1)  Moderate Impairment: Makes only minor adjustments to walking speed, or accomplishes a change in speed with significant gait deviations, or changes speed but has significant gait deviations, or changes speed but loses balance but is able to recover and continue walking.  (0)  Severe Impairment: Cannot change speeds, or loses balance and has to reach for wall or be caught.    Gait with horizontal head turns: 2  (3)  Normal: Performs head turns smoothly with no change in gait.  (2)  Mild Impairment: Preforms head turns smoothly with slight change in gait velocity, i.e., minor disruption to smooth gait path or uses walking aid.  (1) Moderate Impairment: Preforms head turns with moderate change in gait velocity, slows down, staggers but recovers, can continue to walk.  (0)  Severe Impairment: Preform task with severe disruption of gait, i.e., staggers outside 15” path, loses balance, stops, reaches for wall.    Gait with vertical head turns: 2  Grading: Gabriel the lowest category that applies.  (3) Normal: Preforms head turns smoothly with no change in gait.  (2) Mild Impairment: Preforms head turns smoothly with slight change in gait velocity, i.e., minor disruption to smooth gait path or uses walking aid.  (1) Moderate Impairment: Preforms head turns with moderate change in gait velocity, slows down, staggers but recovers, can continue to walk.  (0) Severe Impairment: Preforms task with severe disruption of gait, i.e., staggers outside 15”  path, loses balance, stops, reaches for wall.            Strength/MMT:  Hip Knee   Flexion: R 4/5; L 4/5  ER: R 4/5; L 4/5 Flexion: R 4/5; L 4/5  Extension: R 4/5; L 4/5        Ankle     DF: R 4/5; L 4-/5              Today's Treatment and Response:   Pt education was provided on exam findings, treatment diagnosis, treatment plan, expectations, and prognosis.    Today's Treatment       6/17/2025   LE Treatment   Therapeutic Exercise Semi tandem in corner 15sx5  Lateral walks at counter head up   Neuro Re-Education Pt education x10 min   Neuro Re-Educ Minutes 15   Evaluation Minutes 25   Total Time Of Timed Procedures 15   Total Time Of Service-Based Procedures 25   Total Treatment Time 40   HEP Access Code: [email or web address]  URL: [email or web address]  Date: 06/17/2025  Prepared by: Roderick Matson    Exercises  - Semi-Tandem Corner Balance With Eyes Open  - 1 x daily - 7 x weekly - 1 sets - 5 reps - 15s hold  - Side Stepping with Counter Support  - 1 x daily - 7 x weekly - 2 sets - 10 reps        Patient was instructed in and issued a HEP for: Access Code: [email or web address]  URL: [email or web address]  Date: 06/17/2025  Prepared by: Roderick Lucienusson    Exercises  - Semi-Tandem Corner Balance With Eyes Open  - 1 x daily - 7 x weekly - 1 sets - 5 reps - 15s hold  - Side Stepping with Counter Support  - 1 x daily - 7 x weekly - 2 sets - 10 reps    Charges:  PT EVAL: Low Complexity, neuro x1  In agreement with evaluation findings and clinical rationale, this evaluation involved LOW COMPLEXITY decision making due to no personal factors/comorbidities, 1-2 body structures involved/activity limitations, and stable symptoms as documented in the evaluation.                                                                                                                PLAN OF CARE:      Goals: (to be met in 8 visits)   Pt will increase DGI to 12/12 for reduced fall risk.   Pt will increase SLS time to 9s  bilaterally to ambulate on uneven surfaces with reduced fall risk.      Frequency / Duration: Patient will be seen 2x/weekx/week or a total of 8  visits over a 90 day period. Treatment will include: Gait training; Manual Therapy; Neuromuscular Re-education; Therapeutic Activities; Therapeutic Exercise; Home Exercise Program instruction; Patient/Family Education    Education or treatment limitation: None   Rehab Potential: fair         Patient/Family/Caregiver was advised of these findings, precautions, and treatment options and has agreed to actively participate in planning and for this course of care.    Thank you for your referral. Please co-sign or sign and return this letter via fax as soon as possible to [phone]. If you have any questions, please contact me at Dept: [phone]    Sincerely,  Electronically signed by therapist: Roderick Matson PT  Physician's certification required: Yes  I certify the need for these services furnished under this plan of treatment and while under my care.    X___________________________________________________ Date____________________    Certification From: 6/17/2025  To: 9/15/2025

## 2025-06-24 ENCOUNTER — OFFICE VISIT (OUTPATIENT)
Dept: PHYSICAL THERAPY | Age: 85
End: 2025-06-24
Attending: INTERNAL MEDICINE
Payer: MEDICARE

## 2025-06-24 PROCEDURE — 97112 NEUROMUSCULAR REEDUCATION: CPT

## 2025-06-24 NOTE — PROGRESS NOTES
Patient: Kwame Ospina (85 year old, male) Referring Provider:  Insurance:   Diagnosis: Poor balance (R26.89) Jake Bailey  MEDICARE   Date of Surgery: No data recorded Next MD visit:  AARKAREN   Precautions:  Pacemaker No data recorded Referral Information:    Date of Evaluation: Req: 0, Auth: 0, Exp:     06/17/25 POC Auth Visits:          Today's Date   6/24/2025    Subjective  Pt states the exercise facing the wall was harder than he thought and lost his balance. He has been overlapping his feet more.       Pain: pain not reported     Objective  /92 HR 84              Assessment  Pt has difficulty with single leg balance exercises needing min assist to correct during some cone taps. He is CGA or SBA during all other balance exercises. Needs intermittent cues on foot placement for reduced fall risk.    Goals (to be met in 8 visits)   Pt will increase DGI to 12/12 for reduced fall risk.   Pt will increase SLS time to 9s bilaterally to ambulate on uneven surfaces with reduced fall risk.          Plan  Will continue to progress balance for reduced fall risk.    Treatment Last 4 Visits  Treatment Day: 2       6/17/2025 6/24/2025   LE Treatment   Therapeutic Exercise Semi tandem in corner 15sx5  Lateral walks at counter head up    Neuro Re-Education Pt education x10 min Semi tandem in corner 15sx5  Lateral walks at counter head up  5 small cone taps 3 laps  Glenn step overslateral 5 laps 4 hurdles  Weight shifts 20x ea way  Romberg eyes closed head 4 way 10x  Airex rods lateral walks 3 laps      Neuro Re-Educ Minutes 15 45   Evaluation Minutes 25    Total Time Of Timed Procedures 15 45   Total Time Of Service-Based Procedures 25 0   Total Treatment Time 40 45   HEP Access Code: [email or web address]  URL: [email or web address]  Date: 06/17/2025  Prepared by: Roderick Matson    Exercises  - Semi-Tandem Corner Balance With Eyes Open  - 1 x daily - 7 x weekly - 1 sets - 5 reps - 15s hold  - Side Stepping  with Counter Support  - 1 x daily - 7 x weekly - 2 sets - 10 reps         HEP  Access Code: [email or web address]  URL: [email or web address]  Date: 06/17/2025  Prepared by: Roderick Matson    Exercises  - Semi-Tandem Corner Balance With Eyes Open  - 1 x daily - 7 x weekly - 1 sets - 5 reps - 15s hold  - Side Stepping with Counter Support  - 1 x daily - 7 x weekly - 2 sets - 10 reps    Charges     neuro x3

## 2025-07-01 ENCOUNTER — OFFICE VISIT (OUTPATIENT)
Dept: PHYSICAL THERAPY | Age: 85
End: 2025-07-01
Attending: INTERNAL MEDICINE
Payer: MEDICARE

## 2025-07-01 PROCEDURE — 97112 NEUROMUSCULAR REEDUCATION: CPT

## 2025-07-01 PROCEDURE — 97110 THERAPEUTIC EXERCISES: CPT

## 2025-07-01 NOTE — PROGRESS NOTES
Patient: Kwame Ospina (85 year old, male) Referring Provider:  Insurance:   Diagnosis: Poor balance (R26.89) Jake Bailey  MEDICARE   Date of Surgery: No data recorded Next MD visit:  SUSAN   Precautions:  Pacemaker No data recorded Referral Information:    Date of Evaluation: Req: 0, Auth: 0, Exp:     06/17/25 POC Auth Visits:          Today's Date   7/1/2025    Subjective  Pt states he feels about the same as the last visit.       Pain: pain not reported     Objective                 Assessment  Pt performs eulalia step overs without bar assistnace and needs min assist on two occasions to limit fall. He continues with difficulty during SLS but with cues on foot placement on eulalia exercises control improves during some reps. He is given strengthening HEP with ankle weights to improve proximal stability.    Goals (to be met in 8 visits)   Pt will increase DGI to 12/12 for reduced fall risk.   Pt will increase SLS time to 9s bilaterally to ambulate on uneven surfaces with reduced fall risk.              Plan  Will continue to progress balance for reduced fall risk.    Treatment Last 4 Visits  Treatment Day: 3       6/17/2025 6/24/2025 7/1/2025   LE Treatment   Therapeutic Exercise Semi tandem in corner 15sx5  Lateral walks at counter head up  2# LAQ, hip abduction and extension 2x10 ea   Neuro Re-Education Pt education x10 min Semi tandem in corner 15sx5  Lateral walks at counter head up  5 small cone taps 3 laps  Eulalia step overslateral 5 laps 4 hurdles  Weight shifts 20x ea way  Romberg eyes closed head 4 way 10x  Airex rods lateral walks 3 laps    Semi tandem in corner 15sx5   Lateral walks at counter head up   5 small cone taps 3 laps   Eulalia step overslateral 5 laps 4 hurdles not at bar  Weight shifts 20x ea way   Romberg eyes closed head 4 way 10x   Airex rods lateral walks 3 laps   Walking marches at bar 10ft 4 laps   Therapeutic Exercise Minutes   10   Neuro Re-Educ Minutes 15 45 30   Evaluation  Minutes 25     Total Time Of Timed Procedures 15 45 40   Total Time Of Service-Based Procedures 25 0 0   Total Treatment Time 40 45 40   HEP Access Code: [email or web address]  URL: [email or web address]  Date: 06/17/2025  Prepared by: Roderick Matson    Exercises  - Semi-Tandem Corner Balance With Eyes Open  - 1 x daily - 7 x weekly - 1 sets - 5 reps - 15s hold  - Side Stepping with Counter Support  - 1 x daily - 7 x weekly - 2 sets - 10 reps          HEP  Access Code: [email or web address]  URL: [email or web address]  Date: 06/17/2025  Prepared by: Roderick Matson    Exercises  - Semi-Tandem Corner Balance With Eyes Open  - 1 x daily - 7 x weekly - 1 sets - 5 reps - 15s hold  - Side Stepping with Counter Support  - 1 x daily - 7 x weekly - 2 sets - 10 reps    Charges     Neuro x2, therex x1

## 2025-07-03 ENCOUNTER — OFFICE VISIT (OUTPATIENT)
Dept: PHYSICAL THERAPY | Age: 85
End: 2025-07-03
Attending: INTERNAL MEDICINE
Payer: MEDICARE

## 2025-07-03 PROCEDURE — 97112 NEUROMUSCULAR REEDUCATION: CPT

## 2025-07-03 NOTE — PROGRESS NOTES
Patient: Kwame Ospina (85 year old, male) Referring Provider:  Insurance:   Diagnosis: Poor balance (R26.89) Jake Bailey  MEDICARE   Date of Surgery: No data recorded Next MD visit:  AARKAREN   Precautions:  Pacemaker No data recorded Referral Information:    Date of Evaluation: Req: 0, Auth: 0, Exp:     06/17/25 POC Auth Visits:          Today's Date   7/3/2025    Subjective  Pt states he feels about the same as the last visit. No changes noted in movement.       Pain: pain not reported     Objective  see flow sheet              Assessment  Pt continues to have LOB with single leg stance exercises but is able to correct with less assistance.    Goals (to be met in 8 visits)   Pt will increase DGI to 12/12 for reduced fall risk.   Pt will increase SLS time to 9s bilaterally to ambulate on uneven surfaces with reduced fall risk.                  Plan  Will continue to progress balance for reduced fall risk.    Treatment Last 4 Visits  Treatment Day: 4       6/17/2025 6/24/2025 7/1/2025 7/3/2025   LE Treatment   Therapeutic Exercise Semi tandem in corner 15sx5  Lateral walks at counter head up  2# LAQ, hip abduction and extension 2x10 ea    Neuro Re-Education Pt education x10 min Semi tandem in corner 15sx5  Lateral walks at counter head up  5 small cone taps 3 laps  Glenn step overslateral 5 laps 4 hurdles  Weight shifts 20x ea way  Romberg eyes closed head 4 way 10x  Airex rods lateral walks 3 laps    Semi tandem in corner 15sx5   Lateral walks at counter head up   5 small cone taps 3 laps   Glenn step overslateral 5 laps 4 hurdles not at bar  Weight shifts 20x ea way   Romberg eyes closed head 4 way 10x   Airex rods lateral walks 3 laps   Walking marches at bar 10ft 4 laps Semi tandem 15sx5   5 small cone taps 3 laps   Glenn step overslateral 5 laps 4 hurdles not at bar and lateral walk over airex  Romberg eyes closed head 4 way 10x   Airex rods lateral walks 3 laps   Walking marches at bar 10ft 4 laps   Semi  tandem on ariex  Marches with one UE assist   Therapeutic Exercise Minutes   10    Neuro Re-Educ Minutes 15 45 30 45   Evaluation Minutes 25      Total Time Of Timed Procedures 15 45 40 45   Total Time Of Service-Based Procedures 25 0 0 0   Total Treatment Time 40 45 40 45   HEP Access Code: [email or web address]  URL: [email or web address]  Date: 06/17/2025  Prepared by: Roderick Matson    Exercises  - Semi-Tandem Corner Balance With Eyes Open  - 1 x daily - 7 x weekly - 1 sets - 5 reps - 15s hold  - Side Stepping with Counter Support  - 1 x daily - 7 x weekly - 2 sets - 10 reps           HEP  Access Code: [email or web address]  URL: [email or web address]  Date: 06/17/2025  Prepared by: Roderick Matson    Exercises  - Semi-Tandem Corner Balance With Eyes Open  - 1 x daily - 7 x weekly - 1 sets - 5 reps - 15s hold  - Side Stepping with Counter Support  - 1 x daily - 7 x weekly - 2 sets - 10 reps    Charges     neuro x3

## 2025-07-08 ENCOUNTER — OFFICE VISIT (OUTPATIENT)
Dept: PHYSICAL THERAPY | Age: 85
End: 2025-07-08
Attending: INTERNAL MEDICINE
Payer: MEDICARE

## 2025-07-08 PROCEDURE — 97112 NEUROMUSCULAR REEDUCATION: CPT

## 2025-07-08 NOTE — PROGRESS NOTES
Patient: Kwame Ospina (85 year old, male) Referring Provider:  Insurance:   Diagnosis: Poor balance (R26.89) Jake Bailey  MEDICARE   Date of Surgery: No data recorded Next MD visit:  AARKAREN   Precautions:  Pacemaker No data recorded Referral Information:    Date of Evaluation: Req: 0, Auth: 0, Exp:     06/17/25 POC Auth Visits:          Today's Date   7/8/2025    Subjective  Pt states he feels about the same as the last visit.       Pain: pain not reported     Objective  see flow sheet              Assessment  Guarding continues to reduce except during single leg balance exercises. SBA with airex stance and blazepod rotations.    Goals (to be met in 8 visits)   Pt will increase DGI to 12/12 for reduced fall risk.   Pt will increase SLS time to 9s bilaterally to ambulate on uneven surfaces with reduced fall risk.                      Plan  Will continue to progress balance for reduced fall risk. reassess next visit    Treatment Last 4 Visits  Treatment Day: 5       6/24/2025 7/1/2025 7/3/2025 7/8/2025   LE Treatment   Therapeutic Exercise  2# LAQ, hip abduction and extension 2x10 ea     Neuro Re-Education Semi tandem in corner 15sx5  Lateral walks at counter head up  5 small cone taps 3 laps  Glenn step overslateral 5 laps 4 hurdles  Weight shifts 20x ea way  Romberg eyes closed head 4 way 10x  Airex rods lateral walks 3 laps    Semi tandem in corner 15sx5   Lateral walks at counter head up   5 small cone taps 3 laps   Glenn step overslateral 5 laps 4 hurdles not at bar  Weight shifts 20x ea way   Romberg eyes closed head 4 way 10x   Airex rods lateral walks 3 laps   Walking marches at bar 10ft 4 laps Semi tandem 15sx5   5 small cone taps 3 laps   Glenn step overslateral 5 laps 4 hurdles not at bar and lateral walk over airex  Romberg eyes closed head 4 way 10x   Airex rods lateral walks 3 laps   Walking marches at bar 10ft 4 laps   Semi tandem on ariex  Marches with one UE assist Semi tandem 15sx5   5 small  cone taps 3 laps   Glenn step overslateral 5 laps 4 hurdles not at bar and lateral walk over airex   Romberg eyes closed head 4 way 10x   Airex rods lateral walks 3 laps   Walking marches at bar 10ft 4 laps   Semi tandem on ariex   Marches with one UE assist   Tandem stance with RTB rows and multifidi punches  4 blaze pod touches romberg on airex   Therapeutic Exercise Minutes  10     Neuro Re-Educ Minutes 45 30 45 45   Total Time Of Timed Procedures 45 40 45 45   Total Time Of Service-Based Procedures 0 0 0 0   Total Treatment Time 45 40 45 45        HEP  Access Code: [email or web address]  URL: [email or web address]  Date: 06/17/2025  Prepared by: Roderick Matson    Exercises  - Semi-Tandem Corner Balance With Eyes Open  - 1 x daily - 7 x weekly - 1 sets - 5 reps - 15s hold  - Side Stepping with Counter Support  - 1 x daily - 7 x weekly - 2 sets - 10 reps    Charges     neuro x3

## 2025-07-10 ENCOUNTER — OFFICE VISIT (OUTPATIENT)
Dept: PHYSICAL THERAPY | Age: 85
End: 2025-07-10
Attending: INTERNAL MEDICINE
Payer: MEDICARE

## 2025-07-10 PROCEDURE — 97112 NEUROMUSCULAR REEDUCATION: CPT

## 2025-07-10 NOTE — PROGRESS NOTES
Discharge Summary  Pt has attended 6 visits in Physical Therapy.     Patient: Kwame Ospina (85 year old, male) Referring Provider:  Insurance:   Diagnosis: Poor balance (R26.89) Jake Bailey  MEDICARE   Date of Surgery: No data recorded Next MD visit:  SUSAN   Precautions:  Pacemaker No data recorded Referral Information:    Date of Evaluation: Req: 0, Auth: 0, Exp:     06/17/25 POC Auth Visits:          Today's Date   7/10/2025    Subjective  Pt states he feels better and is more aware of his walking.       Pain: pain not reported     Objective          Postural Control:  4-Stage Balance Test:               - Feet together: 30 sec               - Modified Tandem:  30 sec 30 on airex and 30 on flat surface with eyes closed                - Tandem Stance: 30 with significant compensations and 13s with R foot in front   Fall Risk: no               - SLS: R 11 sec, L 2 sec   Fall Risk: yes  [Full tandem stance <10 sec indicates increased risk of falling]  Age appropriate norms for SLS: 60-70 y/o mean = 27.0 sec                                                      70-78 y/o mean = 17.2 sec                                                      80-98 y/o mean = 8.5 sec      TUG (AD, time): 7 sec                              Fall Risk: no  [Performance exceeding the upper limit of confidence intervals are considered increased risk for falls; Charanjit, 2006...               60-70 y/o mean 8.1s (7.1-9.0s)               70-78 y/o mean 9.2s (8.2-10.2s)               80-98 y/o mean 11.3s (10.0-12.7s)]     4 Item Dynamic Gait Index Score: 11/12 Fall Risk: no  [Scores of 10 or less indicate increased risk for falls]  Gait level surface: 3  Grading: Gabriel the lowest category that applies.  (3)       Normal: Walks 20’, no assistive devices, good speed, no evidence of imbalance, normal gait pattern.  (2)        Mild Impairment: Walks 20’, uses assistive devices, slower speed, mild gait deviations.  (1)        Moderate  Impairment: Walks 20’, slow speed, abnormal gait pattern, evidence of imbalance.  (0)        Severe Impairment: Cannot walk 20’ without assistance, severe gait deviations or imbalance.      Change in gait speed: 3  Grading: Gabriel the lowest category that applies.  (3)        Normal: Able to smoothly change walking speed without loss of balance or gait deviation. Shows a significant difference in walking speed between normal, fast and slow speeds.   (2)       Mild Impairment: Is able to change speed but demonstrates mild gait deviations, or no gait deviations but unable to achieve a significant change in velocity, or uses an assistive device.   (1)       Moderate Impairment: Makes only minor adjustments to walking speed, or accomplishes a change in speed with significant gait deviations, or changes speed but has significant gait deviations, or changes speed but loses balance but is able to recover and continue walking.  (0)       Severe Impairment: Cannot change speeds, or loses balance and has to reach for wall or be caught.     Gait with horizontal head turns: 2  (3)       Normal: Performs head turns smoothly with no change in gait.  (2)       Mild Impairment: Preforms head turns smoothly with slight change in gait velocity, i.e., minor disruption to smooth gait path or uses walking aid.  (1)Moderate Impairment: Preforms head turns with moderate change in gait velocity, slows down, staggers but recovers, can continue to walk.  (0)       Severe Impairment: Preform task with severe disruption of gait, i.e., staggers outside 15” path, loses balance, stops, reaches for wall.     Gait with vertical head turns: 3  Grading: Gabriel the lowest category that applies.  (3)Normal: Preforms head turns smoothly with no change in gait.  (2)Mild Impairment: Preforms head turns smoothly with slight change in gait velocity, i.e., minor disruption to smooth gait path or uses walking aid.  (1)Moderate Impairment: Preforms head turns with  moderate change in gait velocity, slows down, staggers but recovers, can continue to walk.  (0)Severe Impairment: Preforms task with severe disruption of gait, i.e., staggers outside 15” path, loses balance, stops, reaches for wall.                 Strength/MMT:  Hip Knee   Flexion: R 4/5; L 4/5  ER: R 4/5; L 4/5 Flexion: R 4/5; L 4/5  Extension: R 4/5; L 4/5         Ankle      DF: R 4/5; L 4-/5              Assessment   Pt presents with an increase in balance leading to functional gains and partially meeting all goals. Pt and PT discuss HEP and progression independently. Pt voiced understanding and performs HEP correctly without reported pain. Pt states they do not have any concerns with discharge at this time. Pt will no longer benefit from skilled therapy at this time.       Goals (to be met in 8 visits)   Pt will increase DGI to 12/12 for reduced fall risk. PARTIALLY MET  Pt will increase SLS time to 9s bilaterally to ambulate on uneven surfaces with reduced fall risk. PARTIALLY MET                     Plan: Discharge.     Patient/Family/Caregiver was advised of these findings, precautions, and treatment options and has agreed to actively participate in planning and for this course of care.    Thank you for your referral. If you have any questions, please contact me at Dept: [phone].    Sincerely,  Electronically signed by therapist: Roderick Matson PT     Physician's certification required:  Yes  Please co-sign or sign and return this letter via fax as soon as possible to [phone].   I certify the need for these services furnished under this plan of treatment and while under my care.    X___________________________________________________ Date____________________    Certification From: 7/10/2025  To:10/8/2025          Treatment Last 4 Visits  Treatment Day: 6       7/1/2025 7/3/2025 7/8/2025 7/10/2025   LE Treatment   Therapeutic Exercise 2# LAQ, hip abduction and extension 2x10 ea      Neuro  Re-Education Semi tandem in corner 15sx5   Lateral walks at counter head up   5 small cone taps 3 laps   Glenn step overslateral 5 laps 4 hurdles not at bar  Weight shifts 20x ea way   Romberg eyes closed head 4 way 10x   Airex rods lateral walks 3 laps   Walking marches at bar 10ft 4 laps Semi tandem 15sx5   5 small cone taps 3 laps   Glenn step overslateral 5 laps 4 hurdles not at bar and lateral walk over airex  Romberg eyes closed head 4 way 10x   Airex rods lateral walks 3 laps   Walking marches at bar 10ft 4 laps   Semi tandem on ariex  Marches with one UE assist Semi tandem 15sx5   5 small cone taps 3 laps   Glenn step overslateral 5 laps 4 hurdles not at bar and lateral walk over airex   Romberg eyes closed head 4 way 10x   Airex rods lateral walks 3 laps   Walking marches at bar 10ft 4 laps   Semi tandem on ariex   Marches with one UE assist   Tandem stance with RTB rows and multifidi punches  4 blaze pod touches romberg on airex Reassessment and pt education x30 min   Therapeutic Exercise Minutes 10      Neuro Re-Educ Minutes 30 45 45 30   Total Time Of Timed Procedures 40 45 45 30   Total Time Of Service-Based Procedures 0 0 0 0   Total Treatment Time 40 45 45 30        HEP  Access Code: [email or web address]  URL: [email or web address]  Date: 06/17/2025  Prepared by: Roderick Matson    Exercises  - Semi-Tandem Corner Balance With Eyes Open  - 1 x daily - 7 x weekly - 1 sets - 5 reps - 15s hold  - Side Stepping with Counter Support  - 1 x daily - 7 x weekly - 2 sets - 10 reps    Charges     neuro x2

## (undated) DEVICE — Device

## (undated) DEVICE — KENDALL SCD EXPRESS SLEEVES, KNEE LENGTH, MEDIUM: Brand: KENDALL SCD

## (undated) DEVICE — NITINOL WIRE STR STIFF 038

## (undated) DEVICE — CYSTO CDS-LF: Brand: MEDLINE INDUSTRIES, INC.

## (undated) DEVICE — CATH RED RUBBER 16FR S

## (undated) DEVICE — GLOVE SURG SENSICARE SZ 7

## (undated) DEVICE — SOL H2O 1000ML BTL

## (undated) DEVICE — CAUTERY CORD DISP E0503

## (undated) DEVICE — BAG URINE URIMETER

## (undated) DEVICE — SYRINGE 30ML LL TIP

## (undated) DEVICE — PLASTC TOOMEY SYRNG DISP

## (undated) NOTE — LETTER
Dear Patient,  Please note that [facility] (“ENT”) does not sell hearing aid devices/supplies.  The audiologists that are overseeing your care today are employed at [facility] as well at [employer].  [employer] is an independent audiology company that sells hearing aid devices and supplies.  The company is not owned or affiliated with [facility].  [employer] is located at:  [place] [email or web address] [facility]  [address], [address]  [place] [address]  [phone]  Your audiologist is recommending that you could benefit from a hearing aid device and/or supplies.  If you decide to purchase a hearing aid device or supplies from [employer] [place] [employer], the audiologists will receive a financial benefit from this sale.    As a patient and a consumer, you have the option to do your own research to find the most appropriate audiology vendor.  There are local and national audiology vendors that sell comparable products and services.  In addition to [employer] [place] [employer], we have provided you with a preliminary list of audiology vendors that will allow you to start the due diligence process.   [place] [email or web address] [facility]  [address], [address]  [place], [place] [address] [phone]  [facility]  [address], [address]  [place] [address]  [phone]  [facility]  [address], [address]  [place] [address]  [phone]  *Accepts Public Aid  [facility]  [address]  [place], [place], [address]  [phone]    Please note that your choice of an audiology vendor will not affect the relationship you have with your audiologist in any capacity.

## (undated) NOTE — LETTER
[facility]  Audelia Parikh 59 [address] [facility] [address], [address] [place] [address]    Consent for Operation    Date: __________________    Time: _______________    1.  I authorize the performance upon Lianet Corona the following operation:    Procedure(s):  Cystoscopy, Clot fredo procedure has been videotaped, the surgeon will obtain the original videotape. The hospital will not be responsible for storage or maintenance of this tape.     6. For the purpose of advancing medical education, I consent to the admittance of observers to t STATEMENTS REQUIRING INSERTION OR COMPLETION WERE FILLED IN.     Signature of Patient:   ___________________________    When the patient is a minor or mentally incompetent to give consent:  Signature of person authorized to consent for patient: ____________ drugs/illegal medications). Failure to inform my anesthesiologist about these medicines may increase my risk of anesthetic complications. · If I am allergic to anything or have had a reaction to anesthesia before.     3. I understand how the anesthesia med I have discussed the procedure and information above with the patient (or patient’s representative) and answered their questions. The patient or their representative has agreed to have anesthesia services.     _______________________________________________

## (undated) NOTE — IP AVS SNAPSHOT
[facility] [place] [address] [place], [address] ~ [phone]                Discharge Summary   6/17/2017    Malia Saeed           Admission Information        Provider Department    6/17/2017 Yonny Whiteside MD  3sw-A Take 1 capsule (100 mg total) by mouth daily as needed for constipation.     Adrienne JONES                           metoprolol Tartrate 25 MG Tabs   Last time this was given:  25 mg on 6/19/2017  9:53 AM   Commonly known as:  LOPRESSOR        Take 1 t Why:  [facility] appointment (Primary Care) on 6/26 at 9:15 am    Contact information:    [address] [address] [address]        Future Appointments     Jul 03, 2017  7:15 AM   Surgery 15 with MD Maria Esther Dia 4.29 (06/17/17)  13.4 (06/17/17)  39.7 (06/17/17)  92.5    (06/17/17)  119.0 (L)       Recent Hematology Lab Results (cont.)  (Last 3 results in the past 90 days)    Neutrophil % Lymphocyte % Monocyte % Eosinophil % Basophil % Prelim Neut Abs Final Neut Ab can help with your Affordable Care Act coverage, as well as all types of Medicaid plans. To get signed up and covered, please call [phone] and ask to get set up for an insurance coverage that is in-network with [facility] Warfarin Sodium 4 MG Oral Tab       Use: Prevent the development or progression of blood clots   Most common side effects: Abnormal bleeding   What to report to your healthcare team: Bruising, blood in urine or stool, or nosebleeds             Non-Narcoti